# Patient Record
Sex: FEMALE | Race: BLACK OR AFRICAN AMERICAN | Employment: OTHER | URBAN - NONMETROPOLITAN AREA
[De-identification: names, ages, dates, MRNs, and addresses within clinical notes are randomized per-mention and may not be internally consistent; named-entity substitution may affect disease eponyms.]

---

## 2020-09-27 ENCOUNTER — HOSPITAL ENCOUNTER (OUTPATIENT)
Age: 85
Setting detail: OBSERVATION
Discharge: NURSING, REHAB OR LONG-TERM CARE | End: 2020-10-06
Attending: INTERNAL MEDICINE | Admitting: INTERNAL MEDICINE
Payer: MEDICARE

## 2020-09-27 PROBLEM — U07.1 COVID-19: Status: ACTIVE | Noted: 2020-09-27

## 2020-09-27 PROCEDURE — 74011250637 HC RX REV CODE- 250/637: Performed by: INTERNAL MEDICINE

## 2020-09-27 PROCEDURE — 99218 HC RM OBSERVATION: CPT

## 2020-09-27 RX ORDER — ONDANSETRON 2 MG/ML
4 INJECTION INTRAMUSCULAR; INTRAVENOUS
Status: DISCONTINUED | OUTPATIENT
Start: 2020-09-27 | End: 2020-10-06 | Stop reason: HOSPADM

## 2020-09-27 RX ORDER — TOPIRAMATE 100 MG/1
100 TABLET, FILM COATED ORAL EVERY 12 HOURS
Status: DISCONTINUED | OUTPATIENT
Start: 2020-09-27 | End: 2020-10-06 | Stop reason: HOSPADM

## 2020-09-27 RX ORDER — ENOXAPARIN SODIUM 100 MG/ML
40 INJECTION SUBCUTANEOUS DAILY
Status: DISCONTINUED | OUTPATIENT
Start: 2020-09-28 | End: 2020-09-28

## 2020-09-27 RX ORDER — LATANOPROST 50 UG/ML
1 SOLUTION/ DROPS OPHTHALMIC EVERY EVENING
Status: DISCONTINUED | OUTPATIENT
Start: 2020-09-28 | End: 2020-10-06 | Stop reason: HOSPADM

## 2020-09-27 RX ORDER — SODIUM CHLORIDE 0.9 % (FLUSH) 0.9 %
5-40 SYRINGE (ML) INJECTION AS NEEDED
Status: DISCONTINUED | OUTPATIENT
Start: 2020-09-27 | End: 2020-09-27

## 2020-09-27 RX ORDER — SODIUM CHLORIDE 0.9 % (FLUSH) 0.9 %
5-40 SYRINGE (ML) INJECTION EVERY 8 HOURS
Status: DISCONTINUED | OUTPATIENT
Start: 2020-09-27 | End: 2020-09-27

## 2020-09-27 RX ORDER — ACETAMINOPHEN 325 MG/1
650 TABLET ORAL
Status: DISCONTINUED | OUTPATIENT
Start: 2020-09-27 | End: 2020-10-06 | Stop reason: HOSPADM

## 2020-09-27 RX ORDER — PRAVASTATIN SODIUM 20 MG/1
20 TABLET ORAL
Status: DISCONTINUED | OUTPATIENT
Start: 2020-09-27 | End: 2020-10-06 | Stop reason: HOSPADM

## 2020-09-27 RX ORDER — LANOLIN ALCOHOL/MO/W.PET/CERES
1000 CREAM (GRAM) TOPICAL DAILY
Status: DISCONTINUED | OUTPATIENT
Start: 2020-09-28 | End: 2020-10-06 | Stop reason: HOSPADM

## 2020-09-27 RX ORDER — TOPIRAMATE 100 MG/1
100 TABLET, FILM COATED ORAL 2 TIMES DAILY
Status: DISCONTINUED | OUTPATIENT
Start: 2020-09-27 | End: 2020-09-27

## 2020-09-27 RX ORDER — PROMETHAZINE HYDROCHLORIDE 25 MG/1
12.5 TABLET ORAL
Status: DISCONTINUED | OUTPATIENT
Start: 2020-09-27 | End: 2020-10-06 | Stop reason: HOSPADM

## 2020-09-27 RX ORDER — POLYETHYLENE GLYCOL 3350 17 G/17G
17 POWDER, FOR SOLUTION ORAL DAILY PRN
Status: DISCONTINUED | OUTPATIENT
Start: 2020-09-27 | End: 2020-10-06 | Stop reason: HOSPADM

## 2020-09-27 RX ORDER — ACETAMINOPHEN 650 MG/1
650 SUPPOSITORY RECTAL
Status: DISCONTINUED | OUTPATIENT
Start: 2020-09-27 | End: 2020-10-06 | Stop reason: HOSPADM

## 2020-09-27 RX ORDER — CALCIUM CARBONATE/VITAMIN D3 250-3.125
2 TABLET ORAL DAILY
Status: DISCONTINUED | OUTPATIENT
Start: 2020-09-28 | End: 2020-10-06 | Stop reason: HOSPADM

## 2020-09-27 RX ORDER — ASPIRIN 81 MG/1
81 TABLET ORAL DAILY
Status: DISCONTINUED | OUTPATIENT
Start: 2020-09-28 | End: 2020-10-06 | Stop reason: HOSPADM

## 2020-09-27 RX ADMIN — TOPIRAMATE 100 MG: 100 TABLET, FILM COATED ORAL at 21:53

## 2020-09-27 RX ADMIN — PRAVASTATIN SODIUM 20 MG: 20 TABLET ORAL at 21:52

## 2020-09-27 NOTE — H&P
History and Physical    Patient: Flaco Lai MRN: [id number]      YOB: 1930  Age: 80 y.o. Sex: female      Subjective:      Flaco Lai is a 80 y.o. female who was admitted to [facility] from [facility] for positive COVID test.  Patient has no respiratory symptoms nor any physical complaints at this time. She has been admitted for further monitoring for her positive COVID diagnosis. Past Medical History:   Diagnosis Date    Dyslipidemia     Hematuria     Hypertension     Lymphoma ([address]      No past surgical history on file. No family history on file. Social History     Tobacco Use    Smoking status: Never Smoker    Smokeless tobacco: Never Used   Substance Use Topics    Alcohol use: No     Alcohol/week: 0.0 standard drinks      No Known Allergies  Immunizations are UTD per pt. Patient will be admitted for COVID-19 [U07.1]  to hospitalist service as Observation and evaluated daily via physical assessment, diagnostic testing, and laboratory assessment. Review of Systems:  - CONSTITUTIONAL: Denies  fatigue, weight loss, fever and chills. - HEENT: Denies changes in vision and hearing.    - RESPIRATORY: Denies SOB and cough. ?    - CV: Denies palpitations and CP. ?    - GI: Denies abdominal pain, nausea, vomiting, diarrhea and constipation.    - : Denies dysuria and urinary frequency. ?    - MSK: Denies myalgia and joint pain. ?    - SKIN: Denies rash and pruritus.    - ?NEUROLOGICAL: Denies dizziness, weakness, headache and syncope. ?    - PSYCHIATRIC: Denies recent changes in mood. Denies anxiety and depression. Objective: There were no vitals filed for this visit. Physical Exam:  - GENERAL: Alert and oriented x 1. No acute distress. Well-nourished. ?- HEENT: EOMI. Anicteric. Moist mucous membranes. No scleral icterus. No cervical lymphadenopathy. Oropharynx moist without any lesions    -NECK: Supple, no tracheal deviation, no JVD, no significant lymphadenopathy, no thyromegaly noted. ?- LUNGS: Clear to auscultation bilaterally. No accessory muscle use. ? Chest symmetrical, No wheezing, rales, rhonchi noted. Appropriate respiratory effort.     - CARDIOVASCULAR: Regular rate and rhythm. No murmur, rubs, gallops, No edema appreciated. S1 & S2 audible. - ABDOMEN: Soft, non-tender and non-distended. No palpable masses. , lesions, hepatomegaly. Bowels active X4 quadrants. - SKIN: Warm, dry, intact, no bruising, lesions, or rashes noted. Color appropriate for ethnicity.     - MUSCULOSKELETAL:  no deformities    - NEUROLOGIC: No focal neurological deficits. CN II-XII grossly intact    - PSYCHIATRIC: Calm & Cooperative. Appropriate mood and affect. No results found for this or any previous visit (from the past 12 hour(s)).          Assessment/Plan:   COVID-19  Pt with no respiratory complaints/physical complaints  Supportive Care  Will monitor closely and modify POC accordingly    Hypertension  Continue Inderal    Dyslipidemia  Continue Pravastatin       Signed By: Rachael Trevino NP     September 27, 2020

## 2020-09-27 NOTE — ASSESSMENT & PLAN NOTE
Pt with no respiratory complaints/physical complaints  Supportive Care  Will monitor closely and modify POC accordingly  Currently on room air.   Continue droplet precautions  Patient is day 5 out of 10 days of isolation; anticipate returning back to [facility] October 5, 2020

## 2020-09-28 LAB
ANION GAP SERPL CALC-SCNC: 9 MMOL/L
BASOPHILS # BLD: 0 K/UL
BASOPHILS NFR BLD: 0 %
BUN SERPL-MCNC: 58 MG/DL (ref 9–21)
BUN/CREAT SERPL: 24
CA-I BLD-MCNC: 8.9 MG/DL (ref 8.5–10.5)
CHLORIDE SERPL-SCNC: 110 MMOL/L (ref 94–111)
CO2 SERPL-SCNC: 22 MMOL/L (ref 21–33)
CREAT SERPL-MCNC: 2.4 MG/DL (ref 0.7–1.2)
DIFFERENTIAL METHOD BLD: ABNORMAL
EOSINOPHIL # BLD: 0.1 K/UL
EOSINOPHIL NFR BLD: 2 %
ERYTHROCYTE [DISTWIDTH] IN BLOOD BY AUTOMATED COUNT: 13.6 % (ref 11.6–14.5)
GLUCOSE SERPL-MCNC: 73 MG/DL (ref 70–110)
HCT VFR BLD AUTO: 33.2 % (ref 35–45)
HGB BLD-MCNC: 9.9 % (ref 12–16)
IMM GRANULOCYTES # BLD AUTO: 0 K/UL
IMM GRANULOCYTES NFR BLD AUTO: 0 %
LYMPHOCYTES # BLD: 3.2 K/UL
LYMPHOCYTES NFR BLD: 43 %
MCH RBC QN AUTO: 30.8 PG (ref 24–34)
MCHC RBC AUTO-ENTMCNC: 29.8 G/DL (ref 31–37)
MCV RBC AUTO: 103.4 FL (ref 74–97)
MONOCYTES # BLD: 1.5 K/UL
MONOCYTES NFR BLD: 20 %
NEUTS BAND NFR BLD MANUAL: 4 %
NEUTS SEG # BLD: 2.5 K/UL
NEUTS SEG NFR BLD: 30 %
OTHER CELLS NFR BLD MANUAL: 1 %
PLATELET # BLD AUTO: 206 K/UL (ref 135–420)
PMV BLD AUTO: 10.7 FL
POTASSIUM SERPL-SCNC: 4 MMOL/L (ref 3.2–5.1)
RBC # BLD AUTO: 3.21 M/UL (ref 4.2–5.3)
RBC MORPH BLD: ABNORMAL
SODIUM SERPL-SCNC: 141 MMOL/L (ref 135–145)
WBC # BLD AUTO: 7.4 K/UL (ref 4.6–13.2)

## 2020-09-28 PROCEDURE — 36415 COLL VENOUS BLD VENIPUNCTURE: CPT

## 2020-09-28 PROCEDURE — 74011250637 HC RX REV CODE- 250/637: Performed by: INTERNAL MEDICINE

## 2020-09-28 PROCEDURE — 96372 THER/PROPH/DIAG INJ SC/IM: CPT

## 2020-09-28 PROCEDURE — 99218 HC RM OBSERVATION: CPT

## 2020-09-28 PROCEDURE — 80048 BASIC METABOLIC PNL TOTAL CA: CPT

## 2020-09-28 PROCEDURE — 74011000250 HC RX REV CODE- 250: Performed by: INTERNAL MEDICINE

## 2020-09-28 PROCEDURE — 85025 COMPLETE CBC W/AUTO DIFF WBC: CPT

## 2020-09-28 PROCEDURE — 74011250636 HC RX REV CODE- 250/636: Performed by: INTERNAL MEDICINE

## 2020-09-28 RX ORDER — HEPARIN SODIUM 5000 [USP'U]/ML
5000 INJECTION, SOLUTION INTRAVENOUS; SUBCUTANEOUS EVERY 12 HOURS
Status: DISCONTINUED | OUTPATIENT
Start: 2020-09-28 | End: 2020-10-06 | Stop reason: HOSPADM

## 2020-09-28 RX ADMIN — CALCIUM CARBONATE-CHOLECALCIFEROL TAB 250 MG-125 UNIT 2 TABLET: 250-125 TAB at 09:06

## 2020-09-28 RX ADMIN — ASPIRIN 81 MG: 81 TABLET, COATED ORAL at 09:02

## 2020-09-28 RX ADMIN — HEPARIN SODIUM 5000 UNITS: 5000 INJECTION INTRAVENOUS; SUBCUTANEOUS at 13:47

## 2020-09-28 RX ADMIN — TOPIRAMATE 100 MG: 100 TABLET, FILM COATED ORAL at 21:52

## 2020-09-28 RX ADMIN — PRAVASTATIN SODIUM 20 MG: 20 TABLET ORAL at 21:52

## 2020-09-28 RX ADMIN — HEPARIN SODIUM 5000 UNITS: 5000 INJECTION INTRAVENOUS; SUBCUTANEOUS at 23:08

## 2020-09-28 RX ADMIN — TOPIRAMATE 100 MG: 100 TABLET, FILM COATED ORAL at 09:02

## 2020-09-28 RX ADMIN — CYANOCOBALAMIN TAB 500 MCG 1000 MCG: 500 TAB at 09:02

## 2020-09-28 RX ADMIN — LATANOPROST 1 DROP: 50 SOLUTION OPHTHALMIC at 17:27

## 2020-09-28 NOTE — PROGRESS NOTES
Comprehensive Nutrition Assessment    Type and Reason for Visit: Initial    Nutrition Recommendations/Plan: Add Ensure Enlive  BID at breakfast and Lunch to current diet of Cardiac. Encourage patient she is safe and eating will benefit her ability to fight the virus. Nutrition Assessment:       Malnutrition Assessment:  Malnutrition Status: At risk for malnutrition (specify)(Dx COVID-19 may reduce intake)    Context:  Acute illness     Findings of the 6 clinical characteristics of malnutrition:   Energy Intake:  1 - 75% or less of est energy req for 7 or more days  Weight Loss:  No significant weight loss     Body Fat Loss:  No significant body fat loss,     Muscle Mass Loss:  1 - Mild muscle mass loss, Calf  Fluid Accumulation:  No significant fluid accumulation,     Strength:  Not performed         Estimated Daily Nutrient Needs:  Energy (kcal):  0238-9746 Kcal/d (20-25 kcal/kg)  Protein (g):  75-94 gm/d (1-1.25 gm/kg)       Fluid (ml/day):  1645-1943 ml/d (25-30 ml/kg)    Nutrition Related Findings:         Wounds:    None       Current Nutrition Therapies:  DIET CARDIAC Regular    Anthropometric Measures:  · Height:  5' 5\" (165.1 cm)  · Current Body Wt:  86.2 kg (190 lb 0.6 oz)   · Admission Body Wt:       · Usual Body Wt:  166 lb 3.6 oz     · Ideal Body Wt:  125 lbs:  152 %   · Adjusted Body Weight:   ; Weight Adjustment for: No adjustment   · Adjusted BMI:       · BMI Category:  Obese class 1 (BMI 30.0-34. 9)       Nutrition Diagnosis:   · Inadequate protein-energy intake related to psychological cause or life stress, renal dysfunction, other (specify)(recent dx of COVID -19 currently asymtomatic) as evidenced by lab values, other (specify)(altered environment, pt c/o COVID dx.)      Nutrition Interventions:   Food and/or Nutrient Delivery: Start oral nutrition supplement  Nutrition Education and Counseling: No recommendations at this time  Coordination of Nutrition Care: Interdisciplinary rounds    Goals:  Intake 51-75% of meals with intact skin and maintain electrolytes WNl and BUN/Creatinine stable.        Nutrition Monitoring and Evaluation:   Behavioral-Environmental Outcomes: (Labs/intake)  Food/Nutrient Intake Outcomes: Food and nutrient intake, Supplement intake  Physical Signs/Symptoms Outcomes: Biochemical data    Discharge Planning:    Continue current diet, Continue oral nutrition supplement     Electronically signed by Fredy Sims on 9/28/2020 at 12:17 PM    Contact:[phone]

## 2020-09-28 NOTE — PROGRESS NOTES
Patient had an incontinent episode of diarrhea. Brief clean and dry. Repositioned. Bed in lowest position. CBWR.

## 2020-09-28 NOTE — PROGRESS NOTES
Confirmed code status with [facility], patient has DNR in place and had hospice services in place prior to transitioning to the hospital.

## 2020-09-28 NOTE — PROGRESS NOTES
Problem: Falls - Risk of  Goal: *Absence of Falls  Description: Document Alisha Beto Fall Risk and appropriate interventions in the flowsheet.   Outcome: Progressing Towards Goal  Note: Fall Risk Interventions:

## 2020-09-28 NOTE — PROGRESS NOTES
Assessment completed. Pt. Resting comfortably in bed, NAD noted. Pt. Is oriented to self only. Bed in lowest position, safety measures in place. Will continue to monitor.  CBWR

## 2020-09-28 NOTE — PROGRESS NOTES
Progress Notes    Patient: Simone Cortés MRN: [id number]      YOB: 1930  Age: 80 y.o. Sex: female      Subjective:      Simone Cortés is a 80 y.o. female with PMH HTN, dyslipidemia who was admitted 9/27/20 due to a positive COVID 19 test from the [facility]. She is however asymptomatic not in distress, and with stable VS.    Seen and examined in room today. Alert and oriented to self and person. However, not replying to questions asked this a.m    Past Medical History:   Diagnosis Date    Dyslipidemia     Hematuria     Hypertension     Lymphoma ([address]      No past surgical history on file. No family history on file. Social History     Tobacco Use    Smoking status: Never Smoker    Smokeless tobacco: Never Used   Substance Use Topics    Alcohol use: No     Alcohol/week: 0.0 standard drinks      No Known Allergies  Immunizations are UTD per pt. Patient will be admitted for COVID-19 [U07.1]  to hospitalist service as Observation and evaluated daily via physical assessment, diagnostic testing, and laboratory assessment. Review of Systems:  - Unable to obtain ROS, as patient not responding to questions. Objective:     Vitals:    09/27/20 2025 09/28/20 0150 09/28/20 0908 09/28/20 1123   BP:  (!) 125/58 130/63    Pulse:  80 82    Resp:  20 16    Temp:  98.1 °F (36.7 °C) 98.2 °F (36.8 °C)    SpO2:  96% 98%    Weight: 86.2 kg (190 lb 0.6 oz)      Height: 5' 5\" (1.651 m)   5' 5\" (1.651 m)        Physical Exam:  - GENERAL: Alert and oriented x 1. No acute distress. Well-nourished. ?- HEENT: EOMI. Anicteric. Moist mucous membranes. No scleral icterus. No cervical lymphadenopathy. Oropharynx moist without any lesions    -NECK: Supple, no tracheal deviation, no JVD, no significant lymphadenopathy, no thyromegaly noted. ?- LUNGS: Clear to auscultation bilaterally. No accessory muscle use. ? Chest symmetrical, No wheezing, rales, rhonchi noted.  Appropriate respiratory effort.     - CARDIOVASCULAR: Regular rate and rhythm. No murmur, rubs, gallops, No edema appreciated. S1 & S2 audible. - ABDOMEN: Soft, non-tender and non-distended. No palpable masses. , lesions, hepatomegaly. Bowels active X4 quadrants. - SKIN: Warm, dry, intact, no bruising, lesions, or rashes noted. Color appropriate for ethnicity.     - MUSCULOSKELETAL:  no deformities    - NEUROLOGIC: No focal neurological deficits. CN II-XII grossly intact    - PSYCHIATRIC: Calm & Cooperative. Appropriate mood and affect. Recent Results (from the past 12 hour(s))   METABOLIC PANEL, BASIC    Collection Time: 09/28/20  4:50 AM   Result Value Ref Range    Sodium 141 135 - 145 mmol/L    Potassium 4.0 3.2 - 5.1 mmol/L    Chloride 110 94 - 111 mmol/L    CO2 22 21 - 33 mmol/L    Anion gap 9 mmol/L    Glucose 73 70 - 110 mg/dL    BUN 58 (H) 9 - 21 mg/dL    Creatinine 2.40 (H) 0.70 - 1.20 mg/dL    BUN/Creatinine ratio 24      GFR est AA 23 ml/min/1.73m2    GFR est non-AA 19 ml/min/1.73m2    Calcium 8.9 8.5 - 10.5 mg/dL   CBC WITH AUTOMATED DIFF    Collection Time: 09/28/20  4:50 AM   Result Value Ref Range    WBC 7.4 4.6 - 13.2 K/uL    RBC 3.21 (L) 4.20 - 5.30 M/uL    HGB 9.9 (L) 12.0 - 16.0 %    HCT 33.2 (L) 35.0 - 45.0 %    .4 (H) 74.0 - 97.0 FL    MCH 30.8 24.0 - 34.0 PG    MCHC 29.8 (L) 31.0 - 37.0 g/dL    RDW 13.6 11.6 - 14.5 %    PLATELET 918 231 - 573 K/uL    MPV 10.7 FL    NEUTROPHILS 30 %    BAND NEUTROPHILS 4 %    LYMPHOCYTES 43 %    MONOCYTES 20 %    EOSINOPHILS 2 %    BASOPHILS 0 %    OTHER CELL 1 %    IMMATURE GRANULOCYTES 0 %    ABS. NEUTROPHILS 2.5 K/UL    ABS. LYMPHOCYTES 3.2 K/UL    ABS. MONOCYTES 1.5 K/UL    ABS. EOSINOPHILS 0.1 K/UL    ABS. BASOPHILS 0.0 K/UL    ABS. IMM. GRANS. 0.0 K/UL    DF Manual      RBC COMMENTS Macrocytosis  1+                Assessment/Plan:    #1. COVID-19 Positive:  -Asymptomatic.  -Continue supportive care.  -Case management for d/c planning. #2: HTN:  -Chronic condition, controlled.   -On Inderal.    #3: Renal Insufficiency  -Cr level 2.4, BUN 20, likely chronic. -BMP in a.m. monitor. #4: Dyslipidemia.  -On statin.     Signed By: Samuel Bueno NP     September 28, 2020 ELADIA (acute kidney injury)

## 2020-09-29 PROCEDURE — 99218 HC RM OBSERVATION: CPT

## 2020-09-29 PROCEDURE — 74011250636 HC RX REV CODE- 250/636: Performed by: INTERNAL MEDICINE

## 2020-09-29 PROCEDURE — 96372 THER/PROPH/DIAG INJ SC/IM: CPT

## 2020-09-29 PROCEDURE — 74011250637 HC RX REV CODE- 250/637: Performed by: INTERNAL MEDICINE

## 2020-09-29 RX ADMIN — CALCIUM CARBONATE-CHOLECALCIFEROL TAB 250 MG-125 UNIT 2 TABLET: 250-125 TAB at 09:56

## 2020-09-29 RX ADMIN — LATANOPROST 1 DROP: 50 SOLUTION OPHTHALMIC at 18:00

## 2020-09-29 RX ADMIN — CYANOCOBALAMIN TAB 500 MCG 1000 MCG: 500 TAB at 09:56

## 2020-09-29 RX ADMIN — HEPARIN SODIUM 5000 UNITS: 5000 INJECTION INTRAVENOUS; SUBCUTANEOUS at 13:57

## 2020-09-29 RX ADMIN — TOPIRAMATE 100 MG: 100 TABLET, FILM COATED ORAL at 09:56

## 2020-09-29 RX ADMIN — ASPIRIN 81 MG: 81 TABLET, COATED ORAL at 09:56

## 2020-09-29 NOTE — PROGRESS NOTES
PT. Has slept soundly through the night. Incontinence checks q2h and prn, pt. Was changed of incontinent urine x3. Pt. Had a small formed stool this shift. NAD noted through the night. No SOB noted. Pt. pleasantly confused. Bed in lowest position, CBWR. Will continue to monitor.

## 2020-09-29 NOTE — PROGRESS NOTES
Bedside and Verbal shift change report given to Khushboo Agarwal RN  (oncoming nurse) by Carollynn Epley, LPN (offgoing nurse). Report included the following information SBAR, Kardex, Procedure Summary, Intake/Output, MAR, Recent Results, Med Rec Status, Alarm Parameters , Quality Measures and Dual Neuro Assessment.

## 2020-09-29 NOTE — PROGRESS NOTES
HS medication given, pt. Tolerated well. Brief clean and dry. Lungs clear with diminished bases. No cough or SOB noted. Pt. Resting comfortably in bed, will continue to monitor.

## 2020-09-29 NOTE — PROGRESS NOTES
Progress Notes    Patient: Cecelia Arias MRN: [id number]      YOB: 1930  Age: 80 y.o. Sex: female      Subjective:      Cecelia Arias is a 80 y.o. female with PMH HTN, dyslipidemia who was admitted 9/27/20 due to a positive COVID 19 test from the [facility]. She is however asymptomatic not in distress, and with stable VS.    Today is hospital day #3, continuing supportive care only. Seen and examined in room today. Alert and oriented to self and person, eating breakfast.  No complaints voiced. Overnight stay uneventful. Past Medical History:   Diagnosis Date    Dyslipidemia     Hematuria     Hypertension     Lymphoma ([address]      No past surgical history on file. No family history on file. Social History     Tobacco Use    Smoking status: Never Smoker    Smokeless tobacco: Never Used   Substance Use Topics    Alcohol use: No     Alcohol/week: 0.0 standard drinks      No Known Allergies  IReview of Systems:  -Negative except as mentioned above in HPI. Objective:     Vitals:    09/28/20 1721 09/28/20 2035 09/28/20 2309 09/29/20 0843   BP: 128/78 (!) 128/53 (!) 125/59 (!) 135/55   Pulse: 95 78 85 91   Resp: 20 16 18 16   Temp: 98.9 °F (37.2 °C) 98 °F (36.7 °C) 97.1 °F (36.2 °C) 99.5 °F (37.5 °C)   SpO2: 100% 98% 98%    Weight:       Height:            Physical Exam:  - GENERAL: Alert and oriented x 1. No acute distress. Well-nourished. ?- HEENT: EOMI. Anicteric. Moist mucous membranes. No scleral icterus. No cervical lymphadenopathy. Oropharynx moist without any lesions    -NECK: Supple, no tracheal deviation, no JVD, no significant lymphadenopathy, no thyromegaly noted. ?- LUNGS: Clear to auscultation bilaterally. No accessory muscle use. ? Chest symmetrical, No wheezing, rales, rhonchi noted. Appropriate respiratory effort.     - CARDIOVASCULAR: Regular rate and rhythm. No murmur, rubs, gallops, No edema appreciated. S1 & S2 audible.      - ABDOMEN: Soft, non-tender and non-distended. No palpable masses. , lesions, hepatomegaly. Bowels active X4 quadrants. - SKIN: Warm, dry, intact, no bruising, lesions, or rashes noted. Color appropriate for ethnicity.     - MUSCULOSKELETAL:  no deformities    - NEUROLOGIC: No focal neurological deficits. CN II-XII grossly intact    - PSYCHIATRIC: Calm & Cooperative. Appropriate mood and affect. No results found for this or any previous visit (from the past 12 hour(s)). Assessment/Plan:    #1. COVID-19 Positive:  -Asymptomatic.  -Continue supportive care.  -Case management for d/c planning. #2: HTN:  -Chronic condition, controlled.  -On Inderal.    #3: Renal Insufficiency  -Cr level 2.4, BUN 20, likely chronic. -BMP in a.m. monitor. #4: Dyslipidemia.  -On statin.     Signed By: Cass Juarez NP     September 29, 2020

## 2020-09-30 LAB
ANION GAP SERPL CALC-SCNC: 8 MMOL/L
BUN SERPL-MCNC: 56 MG/DL (ref 9–21)
BUN/CREAT SERPL: 22
CA-I BLD-MCNC: 9.1 MG/DL (ref 8.5–10.5)
CHLORIDE SERPL-SCNC: 112 MMOL/L (ref 94–111)
CO2 SERPL-SCNC: 23 MMOL/L (ref 21–33)
CREAT SERPL-MCNC: 2.5 MG/DL (ref 0.7–1.2)
GLUCOSE SERPL-MCNC: 74 MG/DL (ref 70–110)
POTASSIUM SERPL-SCNC: 3.9 MMOL/L (ref 3.2–5.1)
SODIUM SERPL-SCNC: 143 MMOL/L (ref 135–145)

## 2020-09-30 PROCEDURE — 74011250636 HC RX REV CODE- 250/636: Performed by: NURSE PRACTITIONER

## 2020-09-30 PROCEDURE — 99218 HC RM OBSERVATION: CPT

## 2020-09-30 PROCEDURE — 80048 BASIC METABOLIC PNL TOTAL CA: CPT

## 2020-09-30 PROCEDURE — 74011250636 HC RX REV CODE- 250/636: Performed by: INTERNAL MEDICINE

## 2020-09-30 PROCEDURE — 74011250637 HC RX REV CODE- 250/637: Performed by: INTERNAL MEDICINE

## 2020-09-30 PROCEDURE — 36415 COLL VENOUS BLD VENIPUNCTURE: CPT

## 2020-09-30 PROCEDURE — 96372 THER/PROPH/DIAG INJ SC/IM: CPT

## 2020-09-30 RX ORDER — SODIUM CHLORIDE 9 MG/ML
75 INJECTION, SOLUTION INTRAVENOUS CONTINUOUS
Status: DISCONTINUED | OUTPATIENT
Start: 2020-09-30 | End: 2020-10-01

## 2020-09-30 RX ADMIN — PRAVASTATIN SODIUM 20 MG: 20 TABLET ORAL at 23:25

## 2020-09-30 RX ADMIN — CYANOCOBALAMIN TAB 500 MCG 1000 MCG: 500 TAB at 10:34

## 2020-09-30 RX ADMIN — CALCIUM CARBONATE-CHOLECALCIFEROL TAB 250 MG-125 UNIT 2 TABLET: 250-125 TAB at 10:34

## 2020-09-30 RX ADMIN — TOPIRAMATE 100 MG: 100 TABLET, FILM COATED ORAL at 10:34

## 2020-09-30 RX ADMIN — TOPIRAMATE 100 MG: 100 TABLET, FILM COATED ORAL at 23:26

## 2020-09-30 RX ADMIN — ASPIRIN 81 MG: 81 TABLET, COATED ORAL at 10:34

## 2020-09-30 RX ADMIN — HEPARIN SODIUM 5000 UNITS: 5000 INJECTION INTRAVENOUS; SUBCUTANEOUS at 23:25

## 2020-09-30 RX ADMIN — SODIUM CHLORIDE 75 ML/HR: 9 INJECTION, SOLUTION INTRAVENOUS at 14:37

## 2020-09-30 RX ADMIN — LATANOPROST 1 DROP: 50 SOLUTION OPHTHALMIC at 17:30

## 2020-09-30 RX ADMIN — HEPARIN SODIUM 5000 UNITS: 5000 INJECTION INTRAVENOUS; SUBCUTANEOUS at 11:11

## 2020-09-30 NOTE — PROGRESS NOTES
Bedside and Verbal shift change report given to Lisa Cortez RN  (oncoming nurse) by Khushboo Agarwal RN  (offgoing nurse). Report included the following information SBAR, Kardex, ED Summary, Procedure Summary, Intake/Output, MAR, Recent Results, Med Rec Status, Alarm Parameters , Quality Measures and Dual Neuro Assessment.

## 2020-09-30 NOTE — PROGRESS NOTES
Problem: Pressure Injury - Risk of  Goal: *Prevention of pressure injury  Description: Document Booker Scale and appropriate interventions in the flowsheet. Outcome: Progressing Towards Goal  Note: Pressure Injury Interventions:  Sensory Interventions: Keep linens dry and wrinkle-free    Moisture Interventions: Absorbent underpads         Mobility Interventions: Turn and reposition approx. every two hours(pillow and wedges)    Nutrition Interventions: Document food/fluid/supplement intake    Friction and Shear Interventions: Minimize layers                Problem: Patient Education: Go to Patient Education Activity  Goal: Patient/Family Education  Outcome: Progressing Towards Goal     Problem: Falls - Risk of  Goal: *Absence of Falls  Description: Document Angelo Fall Risk and appropriate interventions in the flowsheet.   Outcome: Progressing Towards Goal  Note: Fall Risk Interventions:                                Problem: Nutrition Deficit  Goal: *Optimize nutritional status  Outcome: Progressing Towards Goal

## 2020-09-30 NOTE — PROGRESS NOTES
Progress Notes    Patient: Lasalle Councilman MRN: [id number]      YOB: 1930  Age: 80 y.o. Sex: female      Subjective:      Lasalle Councilman is a 80 y.o. AA female with PMH HTN, dyslipidemia who was admitted 9/27/20 due to a positive COVID 19 test from the [facility]. She is however asymptomatic not in distress, and with stable VS. She is tolerating room air. Today is hospital day #4, continuing supportive care only. Seen and examined in room today. Alert and oriented to person and place only. Denies complaints, no concerns voiced by nursing. Past Medical History:   Diagnosis Date    Dyslipidemia     Hematuria     Hypertension     [address] [address]      No past surgical history on file. No family history on file. Social History     Tobacco Use    Smoking status: Never Smoker    Smokeless tobacco: Never Used   Substance Use Topics    Alcohol use: No     Alcohol/week: 0.0 standard drinks      No Known Allergies  IReview of Systems:  -Negative except as mentioned above in HPI. Objective:     Vitals:    09/29/20 0843 09/29/20 2000 09/30/20 0400 09/30/20 0822   BP: (!) 135/55 (!) 119/43 (!) 126/52 (!) 115/55   Pulse: 91 82 86 86   Resp: 16 20 20 18   Temp: 99.5 °F (37.5 °C) 99.2 °F (37.3 °C) 99 °F (37.2 °C) 98.1 °F (36.7 °C)   SpO2:  100% 100%    Weight:       Height:            Physical Exam:  - GENERAL: Alert and oriented x 1. No acute distress. Well-nourished.      - HEENT: EOMI. Anicteric. Moist mucous membranes. No scleral icterus. No cervical lymphadenopathy. Oropharynx moist without any lesions    -NECK: Supple, no tracheal deviation, no JVD, no significant lymphadenopathy, no thyromegaly noted. - LUNGS: Clear to auscultation bilaterally. No accessory muscle use. Chest symmetrical, No wheezing, rales, rhonchi noted. Appropriate respiratory effort. Tolerating room air.     - CARDIOVASCULAR: Regular rate and rhythm. No murmur, rubs, gallops, No edema appreciated.  S1 & S2 audible. - ABDOMEN: Soft, non-tender and non-distended. No palpable masses. , lesions, hepatomegaly. Bowels active X4 quadrants. - SKIN: Warm, dry, intact, no bruising, lesions, or rashes noted. Color appropriate for ethnicity.     - MUSCULOSKELETAL:  no deformities    - NEUROLOGIC: No focal neurological deficits. CN II-XII grossly intact. A&O to person and place.     - PSYCHIATRIC: Calm & Cooperative. Appropriate mood and affect. Recent Results (from the past 12 hour(s))   METABOLIC PANEL, BASIC    Collection Time: 09/30/20  3:48 AM   Result Value Ref Range    Sodium 143 135 - 145 mmol/L    Potassium 3.9 3.2 - 5.1 mmol/L    Chloride 112 (H) 94 - 111 mmol/L    CO2 23 21 - 33 mmol/L    Anion gap 8 mmol/L    Glucose 74 70 - 110 mg/dL    BUN 56 (H) 9 - 21 mg/dL    Creatinine 2.50 (H) 0.70 - 1.20 mg/dL    BUN/Creatinine ratio 22      GFR est AA 22 ml/min/1.73m2    GFR est non-AA 18 ml/min/1.73m2    Calcium 9.1 8.5 - 10.5 mg/dL            Assessment/Plan:    #1. COVID-19 Positive:  -Asymptomatic.  -Continue supportive care.  -Case management for d/c planning. #2: HTN:  -Chronic condition, controlled. -B/P 115/55  -On Inderal.    #3: Renal Insufficiency  -Cr level 2.5.  -BUN 50  -Likely dehydration  -Add gentle hydration    -Nephro consult if not improved with gentle IV hydration   -Encourage PO intake  -BMP in a.m. #4: Dyslipidemia.  -On statin.     Signed By: Rachael Aleman NP     September 30, 2020

## 2020-10-01 PROBLEM — N18.30 CKD (CHRONIC KIDNEY DISEASE), STAGE III (HCC): Status: ACTIVE | Noted: 2020-10-01

## 2020-10-01 LAB
ANION GAP SERPL CALC-SCNC: 10 MMOL/L
BASOPHILS # BLD: 0.1 K/UL (ref 0–0.1)
BASOPHILS NFR BLD: 1 % (ref 0–2)
BUN SERPL-MCNC: 52 MG/DL (ref 9–21)
BUN/CREAT SERPL: 24
CA-I BLD-MCNC: 8.9 MG/DL (ref 8.5–10.5)
CHLORIDE SERPL-SCNC: 114 MMOL/L (ref 94–111)
CO2 SERPL-SCNC: 18 MMOL/L (ref 21–33)
CREAT SERPL-MCNC: 2.2 MG/DL (ref 0.7–1.2)
EOSINOPHIL # BLD: 0.1 K/UL (ref 0–0.4)
EOSINOPHIL NFR BLD: 1 % (ref 0–5)
ERYTHROCYTE [DISTWIDTH] IN BLOOD BY AUTOMATED COUNT: 13.4 % (ref 11.6–14.5)
GLUCOSE SERPL-MCNC: 69 MG/DL (ref 70–110)
HCT VFR BLD AUTO: 31.7 % (ref 35–45)
HGB BLD-MCNC: 9.6 % (ref 12–16)
IMM GRANULOCYTES # BLD AUTO: 0.2 K/UL
IMM GRANULOCYTES NFR BLD AUTO: 2 %
LYMPHOCYTES # BLD: 3.5 K/UL (ref 0.9–3.6)
LYMPHOCYTES NFR BLD: 40 % (ref 21–52)
MAGNESIUM SERPL-MCNC: 2.1 MG/DL (ref 1.7–2.8)
MCH RBC QN AUTO: 31.5 PG (ref 24–34)
MCHC RBC AUTO-ENTMCNC: 30.3 G/DL (ref 31–37)
MCV RBC AUTO: 103.9 FL (ref 74–97)
MONOCYTES # BLD: 1.1 K/UL (ref 0.05–1.2)
MONOCYTES NFR BLD: 12 % (ref 3–10)
NEUTS SEG # BLD: 3.8 K/UL (ref 1.8–8)
NEUTS SEG NFR BLD: 44 % (ref 40–73)
PLATELET # BLD AUTO: 204 K/UL (ref 135–420)
PMV BLD AUTO: 10.6 FL
POTASSIUM SERPL-SCNC: 4.4 MMOL/L (ref 3.2–5.1)
RBC # BLD AUTO: 3.05 M/UL (ref 4.2–5.3)
SODIUM SERPL-SCNC: 142 MMOL/L (ref 135–145)
WBC # BLD AUTO: 8.7 K/UL (ref 4.6–13.2)

## 2020-10-01 PROCEDURE — 83735 ASSAY OF MAGNESIUM: CPT

## 2020-10-01 PROCEDURE — 99218 HC RM OBSERVATION: CPT

## 2020-10-01 PROCEDURE — 96372 THER/PROPH/DIAG INJ SC/IM: CPT

## 2020-10-01 PROCEDURE — 74011250636 HC RX REV CODE- 250/636: Performed by: NURSE PRACTITIONER

## 2020-10-01 PROCEDURE — 74011250636 HC RX REV CODE- 250/636: Performed by: INTERNAL MEDICINE

## 2020-10-01 PROCEDURE — 80048 BASIC METABOLIC PNL TOTAL CA: CPT

## 2020-10-01 PROCEDURE — 74011250637 HC RX REV CODE- 250/637: Performed by: INTERNAL MEDICINE

## 2020-10-01 PROCEDURE — 36415 COLL VENOUS BLD VENIPUNCTURE: CPT

## 2020-10-01 PROCEDURE — 85025 COMPLETE CBC W/AUTO DIFF WBC: CPT

## 2020-10-01 RX ORDER — POTASSIUM CHLORIDE AND SODIUM CHLORIDE 450; 150 MG/100ML; MG/100ML
INJECTION, SOLUTION INTRAVENOUS CONTINUOUS
Status: DISPENSED | OUTPATIENT
Start: 2020-10-01 | End: 2020-10-02

## 2020-10-01 RX ADMIN — LATANOPROST 1 DROP: 50 SOLUTION OPHTHALMIC at 18:00

## 2020-10-01 RX ADMIN — CALCIUM CARBONATE-CHOLECALCIFEROL TAB 250 MG-125 UNIT 2 TABLET: 250-125 TAB at 10:05

## 2020-10-01 RX ADMIN — PRAVASTATIN SODIUM 20 MG: 20 TABLET ORAL at 23:18

## 2020-10-01 RX ADMIN — TOPIRAMATE 100 MG: 100 TABLET, FILM COATED ORAL at 23:18

## 2020-10-01 RX ADMIN — CYANOCOBALAMIN TAB 500 MCG 1000 MCG: 500 TAB at 10:05

## 2020-10-01 RX ADMIN — ASPIRIN 81 MG: 81 TABLET, COATED ORAL at 10:05

## 2020-10-01 RX ADMIN — HEPARIN SODIUM 5000 UNITS: 5000 INJECTION INTRAVENOUS; SUBCUTANEOUS at 23:15

## 2020-10-01 RX ADMIN — TOPIRAMATE 100 MG: 100 TABLET, FILM COATED ORAL at 10:05

## 2020-10-01 RX ADMIN — POTASSIUM CHLORIDE AND SODIUM CHLORIDE: 450; 150 INJECTION, SOLUTION INTRAVENOUS at 12:30

## 2020-10-01 RX ADMIN — HEPARIN SODIUM 5000 UNITS: 5000 INJECTION INTRAVENOUS; SUBCUTANEOUS at 12:32

## 2020-10-01 NOTE — PROGRESS NOTES
Bedside and Verbal shift change report given to Mark Gallagher RN  (oncoming nurse) by Selina Enamorado LPN (offgoing nurse). Report included the following information SBAR, Kardex, Intake/Output, MAR, Recent Results, Med Rec Status, Alarm Parameters , Quality Measures and Dual Neuro Assessment.

## 2020-10-01 NOTE — PROGRESS NOTES
Late Entry: Bedside and Verbal shift change report given to ALAN Lee, @ 8131  (oncoming nurse) by Khushboo Agarwal RN  (offgoing nurse). Report included the following information SBAR, Kardex, Intake/Output, MAR, Recent Results, Med Rec Status, Quality Measures and Dual Neuro Assessment.

## 2020-10-01 NOTE — ASSESSMENT & PLAN NOTE
Renal function appears to be at baseline  Creatinine 2.2  No need for nephrology consult  Encourage fluids by mouth  Monitor renal function daily  Continue 1/2 normal saline at 75 mL's x1 L  Daily BMP

## 2020-10-01 NOTE — PROGRESS NOTES
Verbal shift change report given to [address] Colindres (oncoming nurse) by Carollynn Epley lpn(offgoing nurse). Report included the following information SBAR.

## 2020-10-01 NOTE — PROGRESS NOTES
Progress Note  Date:10/1/2020       Room:Pending sale to [facility]/  Patient Name:Patrick Crenshaw     Date of Birth:[birth year]     Age:89 y.o. Patient is noted at bedside eating breakfast.  He is in no acute distress. Vital signs are stable. Currently day 5 out of 10 of isolation. Subjective    Subjective:  Symptoms:  Resolved. No shortness of breath, chest pain, weakness or anorexia. Diet:  Poor intake. No nausea or vomiting. Activity level: Normal.    Pain:  She reports no pain. Review of Systems   Constitutional: Negative for fever. Respiratory: Negative for shortness of breath. Cardiovascular: Negative for chest pain. Gastrointestinal: Negative for anorexia, nausea and vomiting. Neurological: Negative for weakness. All other systems reviewed and are negative. Objective         Vitals Last 24 Hours:  TEMPERATURE:  Temp  Av.3 °F (36.8 °C)  Min: 97.5 °F (36.4 °C)  Max: 99.4 °F (37.4 °C)  RESPIRATIONS RANGE: Resp  Av  Min: 20  Max: 20  PULSE OXIMETRY RANGE: SpO2  Av %  Min: 100 %  Max: 100 %  PULSE RANGE: Pulse  Av.7  Min: 67  Max: 92  BLOOD PRESSURE RANGE: Systolic (54VKM), DHY:825 , Min:124 , FOL:787   ; Diastolic (94SAL), QBI:51, Min:51, Max:84    I/O (24Hr): Intake/Output Summary (Last 24 hours) at 10/1/2020 1152  Last data filed at 10/1/2020 0700  Gross per 24 hour   Intake 2033.75 ml   Output    Net 2033.75 ml     Objective:  General Appearance:  Comfortable, well-appearing, in no acute distress and not in pain. Vital signs: (most recent): Blood pressure 136/84, pulse 67, temperature 98 °F (36.7 °C), resp. rate 20, height 5' 5\" (1.651 m), weight 86.2 kg (190 lb 0.6 oz), SpO2 100 %. Vital signs are normal.  No fever. Output: Producing urine and producing stool. HEENT: Normal HEENT exam.    Lungs:  Normal effort and normal respiratory rate. Breath sounds clear to auscultation. She is not in respiratory distress.   No decreased breath sounds, wheezes or rhonchi. Heart: Normal rate. Regular rhythm. S1 normal and S2 normal.  No murmur or friction rub. Abdomen: Abdomen is soft. Hypoactive bowel sounds. There is no mass. There is no splenomegaly. There is no hepatomegaly. Extremities: (Essential tremor noted)  Pulses: Distal pulses are intact. Neurological: Patient is alert. Pupils:  Pupils are equal, round, and reactive to light. Skin:  Warm and dry. Labs/Imaging/Diagnostics    Labs:  CBC:  Recent Labs     10/01/20  0405   WBC 8.7   RBC 3.05*   HGB 9.6*   HCT 31.7*   .9*   RDW 13.4        CHEMISTRIES:  Recent Labs     10/01/20  0405 09/30/20  0348    143   K 4.4 3.9   * 112*   CO2 18* 23   BUN 52* 56*   CA 8.9 9.1   MG 2.1  --    PT/INR:No results for input(s): INR, INREXT in the last 72 hours. No lab exists for component: PROTIME  APTT:No results for input(s): APTT in the last 72 hours. LIVER PROFILE:No results for input(s): AST, ALT in the last 72 hours. No lab exists for component: BILIDIR, BILITOT, ALKPHOS  No results found for: ALT, AST, GGT, GGTP, AP, APIT, APX, CBIL, TBIL, TBILI    Imaging Last 24 Hours:  No results found.   Assessment//Plan   Principal Problem:    COVID-19 (9/27/2020)    Active Problems:    HTN (hypertension) (6/12/2009)      Dyslipidemia ()      CKD (chronic kidney disease), stage III (10/1/2020)      CKD (chronic kidney disease), stage III  Renal function appears to be at baseline  Creatinine 2.2  No need for nephrology consult  Encourage fluids by mouth  Monitor renal function daily  Continue 1/2 normal saline at 75 mL's x1 L  Daily BMP    Dyslipidemia  Continue Pravastatin  Continue daily labs  Cardiac diet    HTN (hypertension)  Monitor vital signs according to protocol  Continue home propanolol  Daily BMP  Cardiac diet    * COVID-19  Pt with no respiratory complaints/physical complaints  Supportive Care  Will monitor closely and modify POC accordingly  Currently on room air. Continue droplet precautions  Patient is day 5 out of 10 days of isolation; anticipate returning back to [facility] October 5, 2020      Assessment:    Condition: In stable condition. Unchanged. Plan:   Ad mary activity. Advance diet as tolerated. Give fluids, administer medications as ordered, resume home regimen and resume oral medications.          Electronically signed by Alban Sanders NP on 10/1/2020 at 11:52 AM

## 2020-10-02 LAB
ANION GAP SERPL CALC-SCNC: 7 MMOL/L
BASOPHILS # BLD: 0 K/UL (ref 0–0.1)
BASOPHILS NFR BLD: 1 % (ref 0–2)
BUN SERPL-MCNC: 45 MG/DL (ref 9–21)
BUN/CREAT SERPL: 24
CA-I BLD-MCNC: 8.7 MG/DL (ref 8.5–10.5)
CHLORIDE SERPL-SCNC: 111 MMOL/L (ref 94–111)
CO2 SERPL-SCNC: 22 MMOL/L (ref 21–33)
CREAT SERPL-MCNC: 1.9 MG/DL (ref 0.7–1.2)
EOSINOPHIL # BLD: 0.1 K/UL (ref 0–0.4)
EOSINOPHIL NFR BLD: 1 % (ref 0–5)
ERYTHROCYTE [DISTWIDTH] IN BLOOD BY AUTOMATED COUNT: 13.4 % (ref 11.6–14.5)
GLUCOSE SERPL-MCNC: 83 MG/DL (ref 70–110)
HCT VFR BLD AUTO: 31.5 % (ref 35–45)
HGB BLD-MCNC: 9.5 G/DL (ref 12–16)
IMM GRANULOCYTES # BLD AUTO: 0.3 K/UL
IMM GRANULOCYTES NFR BLD AUTO: 4 %
LYMPHOCYTES # BLD: 2.6 K/UL (ref 0.9–3.6)
LYMPHOCYTES NFR BLD: 33 % (ref 21–52)
MAGNESIUM SERPL-MCNC: 1.8 MG/DL (ref 1.7–2.8)
MCH RBC QN AUTO: 30.9 PG (ref 24–34)
MCHC RBC AUTO-ENTMCNC: 30.2 G/DL (ref 31–37)
MCV RBC AUTO: 102.6 FL (ref 74–97)
MONOCYTES # BLD: 1.1 K/UL (ref 0.05–1.2)
MONOCYTES NFR BLD: 14 % (ref 3–10)
NEUTS SEG # BLD: 3.8 K/UL (ref 1.8–8)
NEUTS SEG NFR BLD: 47 % (ref 40–73)
PLATELET # BLD AUTO: 208 K/UL (ref 135–420)
PMV BLD AUTO: 10.9 FL (ref 9.2–11.8)
POTASSIUM SERPL-SCNC: 4.1 MMOL/L (ref 3.2–5.1)
RBC # BLD AUTO: 3.07 M/UL (ref 4.2–5.3)
SODIUM SERPL-SCNC: 140 MMOL/L (ref 135–145)
WBC # BLD AUTO: 8 K/UL (ref 4.6–13.2)

## 2020-10-02 PROCEDURE — 96372 THER/PROPH/DIAG INJ SC/IM: CPT

## 2020-10-02 PROCEDURE — 74011250636 HC RX REV CODE- 250/636: Performed by: NURSE PRACTITIONER

## 2020-10-02 PROCEDURE — 83735 ASSAY OF MAGNESIUM: CPT

## 2020-10-02 PROCEDURE — 99218 HC RM OBSERVATION: CPT

## 2020-10-02 PROCEDURE — 80048 BASIC METABOLIC PNL TOTAL CA: CPT

## 2020-10-02 PROCEDURE — 74011250636 HC RX REV CODE- 250/636: Performed by: INTERNAL MEDICINE

## 2020-10-02 PROCEDURE — 85025 COMPLETE CBC W/AUTO DIFF WBC: CPT

## 2020-10-02 PROCEDURE — 74011250637 HC RX REV CODE- 250/637: Performed by: INTERNAL MEDICINE

## 2020-10-02 PROCEDURE — 36415 COLL VENOUS BLD VENIPUNCTURE: CPT

## 2020-10-02 RX ORDER — POTASSIUM CHLORIDE AND SODIUM CHLORIDE 450; 150 MG/100ML; MG/100ML
INJECTION, SOLUTION INTRAVENOUS CONTINUOUS
Status: DISPENSED | OUTPATIENT
Start: 2020-10-02 | End: 2020-10-03

## 2020-10-02 RX ADMIN — POTASSIUM CHLORIDE AND SODIUM CHLORIDE: 450; 150 INJECTION, SOLUTION INTRAVENOUS at 06:18

## 2020-10-02 RX ADMIN — HEPARIN SODIUM 5000 UNITS: 5000 INJECTION INTRAVENOUS; SUBCUTANEOUS at 23:36

## 2020-10-02 RX ADMIN — PRAVASTATIN SODIUM 20 MG: 20 TABLET ORAL at 21:02

## 2020-10-02 RX ADMIN — CALCIUM CARBONATE-CHOLECALCIFEROL TAB 250 MG-125 UNIT 2 TABLET: 250-125 TAB at 09:21

## 2020-10-02 RX ADMIN — TOPIRAMATE 100 MG: 100 TABLET, FILM COATED ORAL at 09:21

## 2020-10-02 RX ADMIN — POTASSIUM CHLORIDE AND SODIUM CHLORIDE: 450; 150 INJECTION, SOLUTION INTRAVENOUS at 23:45

## 2020-10-02 RX ADMIN — LATANOPROST 1 DROP: 50 SOLUTION OPHTHALMIC at 18:00

## 2020-10-02 RX ADMIN — ASPIRIN 81 MG: 81 TABLET, COATED ORAL at 09:21

## 2020-10-02 RX ADMIN — CYANOCOBALAMIN TAB 500 MCG 1000 MCG: 500 TAB at 09:21

## 2020-10-02 RX ADMIN — TOPIRAMATE 100 MG: 100 TABLET, FILM COATED ORAL at 21:02

## 2020-10-02 RX ADMIN — HEPARIN SODIUM 5000 UNITS: 5000 INJECTION INTRAVENOUS; SUBCUTANEOUS at 13:09

## 2020-10-02 NOTE — PROGRESS NOTES
RESTING QUIETLY AND AROUSES EASILY. BLOOD DRAWN WITHOUT DIFFICULTY AND SPECIMEN GIVEN TO PHLEBOTOMIST TO CARRY TO LAB TO BE TESTED. PATIENT. SOILED DIPER REMOVED. PATIENT CLEANED AND DRY DIPER APLIED. CALL BELL IN REACH. SIDE RAILS UP AND BED IN LOW POSITION.

## 2020-10-02 NOTE — PROGRESS NOTES
Assumed care of patient during shift change, patient is resting in bed at this time, call bell is in reach.

## 2020-10-02 NOTE — PROGRESS NOTES
PATIENT SITTING UP IN BED WITH EYES CLOSED. PATIENT AROUSES EASILY TO NAME CALLED. FULL BODY ASSESSMENT COMPLETED. SKIN WARM AND DRY. RESPIRATIONS EASY AND UNLABORED. CALL BELL IN REACH. SIDE RAILS UP X2 AND BED IN LOWEST POSITION. PATIENT INCONTIENT OF URINE. SOILED DIPER REMOVED. PATIENT CLEANED AND FRESH DIPER APPLIED. PATIENT POSITIONED ON RIGHT SIDE FOR COMFORT.

## 2020-10-02 NOTE — PROGRESS NOTES
Received care of patient resting in bed with eyes closed. Patient easily aroused patient cleaned of incontinent urine and repositioned.

## 2020-10-02 NOTE — PROGRESS NOTES
Comprehensive Nutrition Assessment    Type and Reason for Visit: Reassess    Nutrition Recommendations/Plan: Add Ensure Enlive TID with meals. Offer fluids between meals and encourage 8-10 cups fluid daily. Nutrition Assessment:  Nursing indicates pt intake has decreased and she will not allow nursing to feed her. Normal intake 51-75% most meals. Pt states to nursing, that her taste has changed. Labs - BUN 45- high and Creatinine 1.9- high- improving  with IVF; H/H 9.5/31. 5- low- recommend check FE studies. Pt is taking more liquid than solids; recommend add Ensure Enlive to each meal.  Spoke with Mrs. Vo - pt's family contact and she is in agreement with nutritional care plan. Malnutrition Assessment:  Malnutrition Status: At risk for malnutrition (specify)(Dx COVID-19 may reduce intake)    Context:  Acute illness     Findings of the 6 clinical characteristics of malnutrition:   Energy Intake:  1 - 75% or less of est energy req for 7 or more days  Weight Loss:  No significant weight loss     Body Fat Loss:  No significant body fat loss,     Muscle Mass Loss:  1 - Mild muscle mass loss, Calf  Fluid Accumulation:  No significant fluid accumulation,     Strength:  Not performed         Estimated Daily Nutrient Needs:  Energy (kcal):  1777-9584 Kcal/d (20--25 Kcal/kg)  Protein (g):  75-94 gm/d (1.0-1.25 gm/kg)       Fluid (ml/day):  3186-9290 ml/d (25-30ml/kg)    Nutrition Related Findings:  Labs - pt appears to have inadequate fluid intake and IVF started; H/H low need FE, folic acid, P00 studies. Nursing reports intake 0-25% meals; intake PTA was 51-75% meals.       Wounds:    None       Current Nutrition Therapies:  DIET CARDIAC Regular  Ensure Enlive TID     Anthropometric Measures:  · Height:  5' 5\" (165.1 cm)  · Current Body Wt:  75.3 kg (166 lb)   · Admission Body Wt:       · Usual Body Wt:  170 lb     · Ideal Body Wt:  125 lbs:  132.8 %   · Adjusted Body Weight:   ; Weight Adjustment for: No adjustment   · Adjusted BMI:       · BMI Category:  Normal weight (BMI 22.0-24.9) age over 72       Nutrition Diagnosis:   · Inadequate protein-energy intake related to altered taste perception, increased demand for energy/nutrients as evidenced by intake 0-25%      Nutrition Interventions:   Food and/or Nutrient Delivery: Start oral nutrition supplement  Nutrition Education and Counseling: No recommendations at this time  Coordination of Nutrition Care: Interdisciplinary rounds    Goals: Increase intake >/-50% meals and supplements with improved BUN, creatinine, skin intact and weight 75Kg =/-1 kg       Nutrition Monitoring and Evaluation:   Behavioral-Environmental Outcomes: (Labs/intake)  Food/Nutrient Intake Outcomes: Supplement intake  Physical Signs/Symptoms Outcomes: Biochemical data, Skin, Weight    Discharge Planning:     Too soon to determine     Electronically signed by Jatin Moon on 10/2/2020 at 1:19 PM    Contact: [phone]

## 2020-10-03 LAB
ANION GAP SERPL CALC-SCNC: 9 MMOL/L
BASOPHILS # BLD: 0 K/UL
BASOPHILS NFR BLD: 0 %
BUN SERPL-MCNC: 37 MG/DL (ref 9–21)
BUN/CREAT SERPL: 19
CA-I BLD-MCNC: 9 MG/DL (ref 8.5–10.5)
CHLORIDE SERPL-SCNC: 109 MMOL/L (ref 94–111)
CO2 SERPL-SCNC: 21 MMOL/L (ref 21–33)
CREAT SERPL-MCNC: 1.9 MG/DL (ref 0.7–1.2)
DIFFERENTIAL METHOD BLD: ABNORMAL
EOSINOPHIL # BLD: 0.2 K/UL
EOSINOPHIL NFR BLD: 2 %
ERYTHROCYTE [DISTWIDTH] IN BLOOD BY AUTOMATED COUNT: 13.5 % (ref 11.6–14.5)
GLUCOSE SERPL-MCNC: 68 MG/DL (ref 70–110)
HCT VFR BLD AUTO: 34.2 % (ref 35–45)
HGB BLD-MCNC: 10.2 G/DL (ref 12–16)
IMM GRANULOCYTES # BLD AUTO: 0.1 K/UL
IMM GRANULOCYTES NFR BLD AUTO: 1 %
LYMPHOCYTES # BLD: 3.6 K/UL
LYMPHOCYTES NFR BLD: 38 %
MAGNESIUM SERPL-MCNC: 1.7 MG/DL (ref 1.7–2.8)
MCH RBC QN AUTO: 30.2 PG (ref 24–34)
MCHC RBC AUTO-ENTMCNC: 29.8 G/DL (ref 31–37)
MCV RBC AUTO: 101.2 FL (ref 74–97)
METAMYELOCYTES NFR BLD MANUAL: 1 %
MONOCYTES # BLD: 0.6 K/UL
MONOCYTES NFR BLD: 6 %
NEUTS BAND NFR BLD MANUAL: 4 %
NEUTS SEG # BLD: 4.5 K/UL
NEUTS SEG NFR BLD: 43 %
OTHER CELLS NFR BLD MANUAL: 5 %
PLATELET # BLD AUTO: 238 K/UL (ref 135–420)
PMV BLD AUTO: 11.3 FL (ref 9.2–11.8)
POTASSIUM SERPL-SCNC: 4.7 MMOL/L (ref 3.2–5.1)
RBC # BLD AUTO: 3.38 M/UL (ref 4.2–5.3)
RBC MORPH BLD: ABNORMAL
SODIUM SERPL-SCNC: 139 MMOL/L (ref 135–145)
WBC # BLD AUTO: 9.5 K/UL (ref 4.6–13.2)

## 2020-10-03 PROCEDURE — 74011250637 HC RX REV CODE- 250/637: Performed by: INTERNAL MEDICINE

## 2020-10-03 PROCEDURE — 36415 COLL VENOUS BLD VENIPUNCTURE: CPT

## 2020-10-03 PROCEDURE — 99218 HC RM OBSERVATION: CPT

## 2020-10-03 PROCEDURE — 96372 THER/PROPH/DIAG INJ SC/IM: CPT

## 2020-10-03 PROCEDURE — 83735 ASSAY OF MAGNESIUM: CPT

## 2020-10-03 PROCEDURE — 74011250636 HC RX REV CODE- 250/636: Performed by: INTERNAL MEDICINE

## 2020-10-03 PROCEDURE — 85025 COMPLETE CBC W/AUTO DIFF WBC: CPT

## 2020-10-03 PROCEDURE — 80048 BASIC METABOLIC PNL TOTAL CA: CPT

## 2020-10-03 RX ADMIN — TOPIRAMATE 100 MG: 100 TABLET, FILM COATED ORAL at 21:49

## 2020-10-03 RX ADMIN — PRAVASTATIN SODIUM 20 MG: 20 TABLET ORAL at 21:49

## 2020-10-03 RX ADMIN — CYANOCOBALAMIN TAB 500 MCG 1000 MCG: 500 TAB at 10:11

## 2020-10-03 RX ADMIN — ASPIRIN 81 MG: 81 TABLET, COATED ORAL at 10:12

## 2020-10-03 RX ADMIN — CALCIUM CARBONATE-CHOLECALCIFEROL TAB 250 MG-125 UNIT 2 TABLET: 250-125 TAB at 10:11

## 2020-10-03 RX ADMIN — HEPARIN SODIUM 5000 UNITS: 5000 INJECTION INTRAVENOUS; SUBCUTANEOUS at 12:04

## 2020-10-03 RX ADMIN — LATANOPROST 1 DROP: 50 SOLUTION OPHTHALMIC at 18:12

## 2020-10-03 RX ADMIN — TOPIRAMATE 100 MG: 100 TABLET, FILM COATED ORAL at 10:11

## 2020-10-03 NOTE — PROGRESS NOTES
Progress Note  Date:10/3/2020       Room:259/01  Patient Name:Patrick Crenshaw     Date of Birth:[birth year]     Age:89 y.o. This is an 80-year-old -American female resident of [facility] who is on day #7 out of 10 of quarantine due to COVID positive status. She remains asymptomatic at this time. She is seen and examined at bedside this morning in no acute distress eating breakfast.  Subjective    Subjective:  Symptoms:  Stable. No shortness of breath, malaise, chest pain or weakness. Diet:  Adequate intake. No nausea or vomiting. Activity level: Normal.    Pain:  She reports no pain. Review of Systems   Constitutional: Negative for fever. Respiratory: Negative for shortness of breath. Cardiovascular: Negative for chest pain. Gastrointestinal: Negative for nausea and vomiting. Neurological: Negative for weakness. All other systems reviewed and are negative. Objective         Vitals Last 24 Hours:  TEMPERATURE:  Temp  Av.8 °F (36.6 °C)  Min: 97.7 °F (36.5 °C)  Max: 97.8 °F (36.6 °C)  RESPIRATIONS RANGE: Resp  Av  Min: 20  Max: 20  PULSE OXIMETRY RANGE: SpO2  Av %  Min: 92 %  Max: 92 %  PULSE RANGE: Pulse  Av.5  Min: 62  Max: 89  BLOOD PRESSURE RANGE: Systolic (55BTJ), SJP:205 , Min:140 , KGO:344   ; Diastolic (33OZA), XLK:65, Min:67, Max:73    I/O (24Hr): Intake/Output Summary (Last 24 hours) at 10/3/2020 0914  Last data filed at 10/3/2020 0454  Gross per 24 hour   Intake 1418 ml   Output    Net 1418 ml     Objective:  General Appearance:  Comfortable, well-appearing, in no acute distress and not in pain. Vital signs: (most recent): Blood pressure (!) 148/67, pulse 89, temperature 97.8 °F (36.6 °C), resp. rate 20, height 5' 5\" (1.651 m), weight 86.2 kg (190 lb 0.6 oz), SpO2 92 %. Vital signs are normal.  No fever. Output: Producing urine and producing stool.     HEENT: Normal HEENT exam.    Lungs:  Normal effort and normal respiratory rate. Breath sounds clear to auscultation. Heart: Normal rate. Regular rhythm. S1 normal and S2 normal.    Abdomen: Abdomen is soft. Bowel sounds are normal.   There is no abdominal tenderness. There is no mass. There is no splenomegaly. There is no hepatomegaly. Extremities: Normal range of motion. Pulses: Distal pulses are intact. Neurological: Patient is alert. Pupils:  Pupils are equal, round, and reactive to light. Skin:  Warm and dry. Labs/Imaging/Diagnostics    Labs:  CBC:  Recent Labs     10/03/20  0345 10/02/20  0615 10/01/20  0405   WBC 9.5 8.0 8.7   RBC 3.38* 3.07* 3.05*   HGB 10.2* 9.5* 9.6*   HCT 34.2* 31.5* 31.7*   .2* 102.6* 103.9*   RDW 13.5 13.4 13.4    208 204     CHEMISTRIES:  Recent Labs     10/03/20  0345 10/02/20  0615 10/01/20  0405    140 142   K 4.7 4.1 4.4    111 114*   CO2 21 22 18*   BUN 37* 45* 52*   CA 9.0 8.7 8.9   MG 1.7 1.8 2.1   PT/INR:No results for input(s): INR, INREXT in the last 72 hours. No lab exists for component: PROTIME  APTT:No results for input(s): APTT in the last 72 hours. LIVER PROFILE:No results for input(s): AST, ALT in the last 72 hours. No lab exists for component: BILIDIR, BILITOT, ALKPHOS  No results found for: ALT, AST, GGT, GGTP, AP, APIT, APX, CBIL, TBIL, TBILI    Imaging Last 24 Hours:  No results found. Assessment//Plan   Principal Problem:    COVID-19 (9/27/2020)    Active Problems:    HTN (hypertension) (6/12/2009)      Dyslipidemia ()      CKD (chronic kidney disease), stage III (10/1/2020)       #1. COVID-19 Positive:  -Asymptomatic.  -Continue supportive care.  -Case management for d/c planning. #2: HTN:  -Chronic condition, controlled. -B/P 115/55  -On Inderal.     #3: Renal Insufficiency  -Cr level 1.90  -BUN 45  -Likely dehydration  -Add gentle hydration    -Nephro consult if not improved with gentle IV hydration   -Encourage PO intake  -BMP in a.m.       #4: Dyslipidemia.  -On statin. Assessment:    Condition: In stable condition. Unchanged. Plan:   Ad mary activity. Advance diet as tolerated. Resume home regimen and resume oral medications.          Electronically signed by Rory Cordero NP on 10/3/2020 at 9:14 AM

## 2020-10-03 NOTE — PROGRESS NOTES
Patient received bedtime medication, reposition in bed, brief noted dry at this time, call bell is in reach.

## 2020-10-03 NOTE — PROGRESS NOTES
Rounding done on patient, patient changed, and subcu heparin given, bed in lowest position and call bell in reach.

## 2020-10-03 NOTE — PROGRESS NOTES
Problem: Pressure Injury - Risk of  Goal: *Prevention of pressure injury  Description: Document Booker Scale and appropriate interventions in the flowsheet. Outcome: Progressing Towards Goal  Note: Pressure Injury Interventions:  Sensory Interventions: Keep linens dry and wrinkle-free, Turn and reposition approx. every two hours (pillows and wedges if needed)    Moisture Interventions: Absorbent underpads, Moisture barrier         Mobility Interventions: Turn and reposition approx. every two hours(pillow and wedges)    Nutrition Interventions: Document food/fluid/supplement intake, Offer support with meals,snacks and hydration    Friction and Shear Interventions: Minimize layers                Problem: Patient Education: Go to Patient Education Activity  Goal: Patient/Family Education  Outcome: Progressing Towards Goal     Problem: Falls - Risk of  Goal: *Absence of Falls  Description: Document Angelo Fall Risk and appropriate interventions in the flowsheet.   Outcome: Progressing Towards Goal  Note: Fall Risk Interventions:       Mentation Interventions: Reorient patient         Elimination Interventions: Call light in reach

## 2020-10-04 LAB — MAGNESIUM SERPL-MCNC: 1.6 MG/DL (ref 1.7–2.8)

## 2020-10-04 PROCEDURE — 99218 HC RM OBSERVATION: CPT

## 2020-10-04 PROCEDURE — 74011250637 HC RX REV CODE- 250/637: Performed by: INTERNAL MEDICINE

## 2020-10-04 PROCEDURE — 36415 COLL VENOUS BLD VENIPUNCTURE: CPT

## 2020-10-04 PROCEDURE — 74011250636 HC RX REV CODE- 250/636: Performed by: INTERNAL MEDICINE

## 2020-10-04 PROCEDURE — 96372 THER/PROPH/DIAG INJ SC/IM: CPT

## 2020-10-04 PROCEDURE — 83735 ASSAY OF MAGNESIUM: CPT

## 2020-10-04 PROCEDURE — 74011250637 HC RX REV CODE- 250/637: Performed by: NURSE PRACTITIONER

## 2020-10-04 RX ADMIN — TOPIRAMATE 100 MG: 100 TABLET, FILM COATED ORAL at 23:30

## 2020-10-04 RX ADMIN — LATANOPROST 1 DROP: 50 SOLUTION OPHTHALMIC at 18:51

## 2020-10-04 RX ADMIN — HEPARIN SODIUM 5000 UNITS: 5000 INJECTION INTRAVENOUS; SUBCUTANEOUS at 12:40

## 2020-10-04 RX ADMIN — TOPIRAMATE 100 MG: 100 TABLET, FILM COATED ORAL at 10:02

## 2020-10-04 RX ADMIN — ACETAMINOPHEN 650 MG: 325 TABLET ORAL at 23:33

## 2020-10-04 RX ADMIN — ACETAMINOPHEN 650 MG: 325 TABLET ORAL at 16:44

## 2020-10-04 RX ADMIN — CYANOCOBALAMIN TAB 500 MCG 1000 MCG: 500 TAB at 10:02

## 2020-10-04 RX ADMIN — HEPARIN SODIUM 5000 UNITS: 5000 INJECTION INTRAVENOUS; SUBCUTANEOUS at 01:48

## 2020-10-04 RX ADMIN — CALCIUM CARBONATE-CHOLECALCIFEROL TAB 250 MG-125 UNIT 2 TABLET: 250-125 TAB at 10:02

## 2020-10-04 RX ADMIN — HEPARIN SODIUM 5000 UNITS: 5000 INJECTION INTRAVENOUS; SUBCUTANEOUS at 23:33

## 2020-10-04 RX ADMIN — ASPIRIN 81 MG: 81 TABLET, COATED ORAL at 10:02

## 2020-10-04 RX ADMIN — PRAVASTATIN SODIUM 20 MG: 20 TABLET ORAL at 23:33

## 2020-10-04 NOTE — PROGRESS NOTES
Progress Note  Date:10/4/2020       Room:259/01  Patient Name:Patrick Crenshaw     Date of Birth:[birth year]     Age:89 y.o. This is an 70-year-old -American female resident of [facility] who is on day #9 out of 10 of quarantine due to COVID positive status. She remains asymptomatic at this time. She is seen and examined at bedside this morning in no acute distress eating breakfast.  Subjective    Subjective:  Symptoms:  Stable. No shortness of breath, malaise, chest pain or weakness. Diet:  Adequate intake. No nausea or vomiting. Activity level: Normal.    Pain:  She reports no pain. Review of Systems   Constitutional: Negative for fever. Respiratory: Negative for shortness of breath. Cardiovascular: Negative for chest pain. Gastrointestinal: Negative for nausea and vomiting. Neurological: Negative for weakness. All other systems reviewed and are negative. Objective         Vitals Last 24 Hours:  TEMPERATURE:  Temp  Av.6 °F (36.4 °C)  Min: 97.4 °F (36.3 °C)  Max: 98 °F (36.7 °C)  RESPIRATIONS RANGE: Resp  Av.7  Min: 18  Max: 20  PULSE OXIMETRY RANGE: SpO2  Av.5 %  Min: 92 %  Max: 97 %  PULSE RANGE: Pulse  Av  Min: 88  Max: 88  BLOOD PRESSURE RANGE: Systolic (26SQZ), BSU:064 , Min:137 , ZOJ:751   ; Diastolic (64EPX), KKA:36, Min:78, Max:85    I/O (24Hr): Intake/Output Summary (Last 24 hours) at 10/4/2020 1054  Last data filed at 10/3/2020 1600  Gross per 24 hour   Intake 680 ml   Output    Net 680 ml     Objective:  General Appearance:  Comfortable, well-appearing, in no acute distress and not in pain. Vital signs: (most recent): Blood pressure (!) 146/78, pulse 88, temperature 97.5 °F (36.4 °C), resp. rate 18, height 5' 5\" (1.651 m), weight 86.2 kg (190 lb 0.6 oz), SpO2 97 %. Vital signs are normal.  No fever. Output: Producing urine and producing stool.     HEENT: Normal HEENT exam.    Lungs:  Normal effort and normal respiratory rate. Breath sounds clear to auscultation. Heart: Normal rate. Regular rhythm. S1 normal and S2 normal.    Abdomen: Abdomen is soft. Bowel sounds are normal.   There is no abdominal tenderness. There is no mass. There is no splenomegaly. There is no hepatomegaly. Extremities: Normal range of motion. Pulses: Distal pulses are intact. Neurological: Patient is alert. Pupils:  Pupils are equal, round, and reactive to light. Skin:  Warm and dry. Labs/Imaging/Diagnostics    Labs:  CBC:  Recent Labs     10/03/20  0345 10/02/20  0615   WBC 9.5 8.0   RBC 3.38* 3.07*   HGB 10.2* 9.5*   HCT 34.2* 31.5*   .2* 102.6*   RDW 13.5 13.4    208     CHEMISTRIES:  Recent Labs     10/04/20  0630 10/03/20  0345 10/02/20  0615   NA  --  139 140   K  --  4.7 4.1   CL  --  109 111   CO2  --  21 22   BUN  --  37* 45*   CA  --  9.0 8.7   MG 1.6* 1.7 1.8   PT/INR:No results for input(s): INR, INREXT, INREXT in the last 72 hours. No lab exists for component: PROTIME  APTT:No results for input(s): APTT in the last 72 hours. LIVER PROFILE:No results for input(s): AST, ALT in the last 72 hours. No lab exists for component: BILIDIR, BILITOT, ALKPHOS  No results found for: ALT, AST, GGT, GGTP, AP, APIT, APX, CBIL, TBIL, TBILI    Imaging Last 24 Hours:  No results found. Assessment//Plan   Principal Problem:    COVID-19 (9/27/2020)    Active Problems:    HTN (hypertension) (6/12/2009)      Dyslipidemia ()      CKD (chronic kidney disease), stage III (10/1/2020)       #1. COVID-19 Positive:  -Asymptomatic.  -Continue supportive care.  -Case management for d/c planning. #2: HTN:  -Chronic condition, controlled. -B/P 137/85  -On Inderal.     #3: Renal Insufficiency  -Cr level 1.90   -Encourage PO intake  -BMP in a.m. #4: Dyslipidemia.  -On statin. Assessment:    Condition: In stable condition. Unchanged. Plan:   Ad mary activity. Advance diet as tolerated. Resume home regimen and resume oral medications. (Return home to [facility] where pt currently resides,  tomorrow, 10/5/2020, if she remains asymptomatic for the next 24 hours. ).        Electronically signed by Ev Mike NP on 10/4/2020 at 9:14 AM

## 2020-10-04 NOTE — PROGRESS NOTES
Problem: Pressure Injury - Risk of  Goal: *Prevention of pressure injury  Description: Document Booker Scale and appropriate interventions in the flowsheet. Outcome: Progressing Towards Goal  Note: Pressure Injury Interventions:  Sensory Interventions: Float heels, Keep linens dry and wrinkle-free, Turn and reposition approx. every two hours (pillows and wedges if needed)    Moisture Interventions: Absorbent underpads, Apply protective barrier, creams and emollients, Maintain skin hydration (lotion/cream), Minimize layers, Moisture barrier         Mobility Interventions: Turn and reposition approx. every two hours(pillow and wedges)    Nutrition Interventions: Document food/fluid/supplement intake, Offer support with meals,snacks and hydration    Friction and Shear Interventions: Apply protective barrier, creams and emollients, Foam dressings/transparent film/skin sealants                Problem: Patient Education: Go to Patient Education Activity  Goal: Patient/Family Education  Outcome: Progressing Towards Goal     Problem: Falls - Risk of  Goal: *Absence of Falls  Description: Document Angelo Fall Risk and appropriate interventions in the flowsheet.   Outcome: Progressing Towards Goal  Note: Fall Risk Interventions:       Mentation Interventions: Reorient patient         Elimination Interventions: Call light in reach              Problem: Nutrition Deficit  Goal: *Optimize nutritional status  Outcome: Progressing Towards Goal

## 2020-10-04 NOTE — PROGRESS NOTES
Temp 100.5 axillary. 2 tylenol given PO for temperature. Pt denies pain, no complaints voiced at this time.

## 2020-10-04 NOTE — PROGRESS NOTES
PATIENT OBSERVED IN BED RESTING AND AROUSES EASILY TO NAME CALLED. FULL BODY ASSESSMENT COMPLETED. PATIENT REORIENTED TO TIME AND PLACE BY NURSE.SOILED DIPER REMOVED AND PATIENT CLEANED. SKIN PROTECTIVE CREAM APPLIED AND CLEAN DIPER PLACED ON PATIENT.patient positioned on right side for comfort. CALL BELL IN REACH. SIDE RAILS UP X2 AND BED IN LOWEST POSITION. IV SITE WITHOUT SIGNS OF INFILTRAION

## 2020-10-04 NOTE — PROGRESS NOTES
rec'd care of pt. Pt resting comfortably in bed. Tremors noted to both upper extremities. Pt is incontinent of urine and stool. Brief intact. Small amt of excoriation noted on buttocks. Foam dressing intact for extra cushion. Patient is confused but pleasant.  TM

## 2020-10-04 NOTE — PROGRESS NOTES
BLOOD DRAWN WITHOUT DIFFICULTY. SPECIMEN GIVEN TO PHLEBOTOMY TO CARRY TO LAB TO BE TESTED. BRIEF NOTED TO BE WET. SOILED BRIEF REMOVED AND DRY ONE APPLIED. CALL BELL IN REACH. SIDE RAILS UP X2 AND BED IN LOWEST POSITION.

## 2020-10-05 LAB
ANION GAP SERPL CALC-SCNC: 7 MMOL/L
BUN SERPL-MCNC: 35 MG/DL (ref 9–21)
BUN/CREAT SERPL: 19
CA-I BLD-MCNC: 8.7 MG/DL (ref 8.5–10.5)
CHLORIDE SERPL-SCNC: 109 MMOL/L (ref 94–111)
CO2 SERPL-SCNC: 21 MMOL/L (ref 21–33)
CREAT SERPL-MCNC: 1.8 MG/DL (ref 0.7–1.2)
GLUCOSE SERPL-MCNC: 77 MG/DL (ref 70–110)
MAGNESIUM SERPL-MCNC: 1.7 MG/DL (ref 1.7–2.8)
POTASSIUM SERPL-SCNC: 4 MMOL/L (ref 3.2–5.1)
SODIUM SERPL-SCNC: 137 MMOL/L (ref 135–145)

## 2020-10-05 PROCEDURE — 74011250637 HC RX REV CODE- 250/637: Performed by: INTERNAL MEDICINE

## 2020-10-05 PROCEDURE — 99218 HC RM OBSERVATION: CPT

## 2020-10-05 PROCEDURE — 96372 THER/PROPH/DIAG INJ SC/IM: CPT

## 2020-10-05 PROCEDURE — 74011250637 HC RX REV CODE- 250/637: Performed by: NURSE PRACTITIONER

## 2020-10-05 PROCEDURE — 36415 COLL VENOUS BLD VENIPUNCTURE: CPT

## 2020-10-05 PROCEDURE — 74011250636 HC RX REV CODE- 250/636: Performed by: INTERNAL MEDICINE

## 2020-10-05 PROCEDURE — 83735 ASSAY OF MAGNESIUM: CPT

## 2020-10-05 PROCEDURE — 80048 BASIC METABOLIC PNL TOTAL CA: CPT

## 2020-10-05 RX ADMIN — LATANOPROST 1 DROP: 50 SOLUTION OPHTHALMIC at 18:42

## 2020-10-05 RX ADMIN — CALCIUM CARBONATE-CHOLECALCIFEROL TAB 250 MG-125 UNIT 2 TABLET: 250-125 TAB at 09:07

## 2020-10-05 RX ADMIN — PRAVASTATIN SODIUM 20 MG: 20 TABLET ORAL at 21:30

## 2020-10-05 RX ADMIN — HEPARIN SODIUM 5000 UNITS: 5000 INJECTION INTRAVENOUS; SUBCUTANEOUS at 12:10

## 2020-10-05 RX ADMIN — ASPIRIN 81 MG: 81 TABLET, COATED ORAL at 09:08

## 2020-10-05 RX ADMIN — TOPIRAMATE 100 MG: 100 TABLET, FILM COATED ORAL at 09:07

## 2020-10-05 RX ADMIN — TOPIRAMATE 100 MG: 100 TABLET, FILM COATED ORAL at 21:30

## 2020-10-05 RX ADMIN — CYANOCOBALAMIN TAB 500 MCG 1000 MCG: 500 TAB at 09:07

## 2020-10-05 RX ADMIN — ACETAMINOPHEN 650 MG: 325 TABLET ORAL at 21:30

## 2020-10-05 NOTE — DISCHARGE SUMMARY
Discharge Summary       PATIENT ID: Lasalle Councilman  MRN: [id number]   YOB: 1930    DATE OF ADMISSION: 9/27/2020  4:25 PM    DATE OF DISCHARGE: 10/05/20    PRIMARY CARE PROVIDER: Alisha Celaya MD     ATTENDING PHYSICIAN: Heather Adkins NP  DISCHARGING PROVIDER: Heather Adkins NP        CONSULTATIONS: None    PROCEDURES/SURGERIES: * No surgery found *    [address] COURSE: COVID 19 Positive    ** Will not d/c patient today due to having a fever on 10/4/20. If remains afebrile overnight, OK to discharge her in the am.    Everardo Curmyranda / PLAN:      COVID-19  Pt with no respiratory complaints/physical complaints  Supportive Care  No oxygen requirements while in the hospital  Day 10/10 in the hospital; D/C to EP today.     Hypertension  Continue Inderal     Dyslipidemia  Continue Pravastatin        ADDITIONAL CARE RECOMMENDATIONS:        PENDING TEST RESULTS:   At the time of discharge the following test results are still pending: None    FOLLOW UP APPOINTMENTS:    Follow-up Information     Follow up With Specialties Details Why Contact Info    Alisha Celaya MD Internal Medicine In 1 week As needed [address] [employer]  [phone]               DIET: Resume previous diet      ACTIVITY: Activity as tolerated and PT/OT Eval and Treat    WOUND CARE: N/A    EQUIPMENT needed: N/A      DISCHARGE MEDICATIONS:  Current Discharge Medication List      CONTINUE these medications which have NOT CHANGED    Details   propranolol LA (INDERAL LA) 120 mg SR capsule Take 120 mg by mouth daily. topiramate (TOPAMAX) 100 mg tablet Take  by mouth two (2) times a day. bimatoprost (LUMIGAN) 0.03 % ophthalmic drops Administer 1 Drop to both eyes every evening. cyanocobalamin 1,000 mcg tablet Take 1,000 mcg by mouth daily. aspirin delayed-release 81 mg tablet Take  by mouth daily.       pravastatin (PRAVACHOL) 20 mg tablet Take 20 mg by mouth nightly. calcium carbonate-vitamin d2 500 mg(1,250mg) -200 unit tab Take  by mouth. NOTIFY YOUR PHYSICIAN FOR ANY OF THE FOLLOWING:   Fever over 101 degrees for 24 hours. Chest pain, shortness of breath, fever, chills, nausea, vomiting, diarrhea, change in mentation, falling, weakness, bleeding. Severe pain or pain not relieved by medications. Or, any other signs or symptoms that you may have questions about. DISPOSITION:    Home With:   OT  PT  HH  RN       Long term SNF/Inpatient Rehab   X Independent/assisted living    Hospice    Other:       PATIENT CONDITION AT DISCHARGE:     Functional status    Poor    X Deconditioned     Independent      Cognition     Lucid     Forgetful    X Dementia      Catheters/lines (plus indication)    Talavera     PICC     PEG    X None      Code status     Full code    X DNR      PHYSICAL EXAMINATION AT DISCHARGE:  Vital signs are normal.  No fever. Output: Producing urine and producing stool. HEENT: Normal HEENT exam.    Lungs:  Normal effort and normal respiratory rate. Breath sounds clear to auscultation. Heart: Normal rate. Regular rhythm. S1 normal and S2 normal.    Abdomen: Abdomen is soft. Bowel sounds are normal.   There is no abdominal tenderness. There is no mass. There is no splenomegaly. There is no hepatomegaly. Extremities: Normal range of motion. Pulses: Distal pulses are intact. Neurological: Patient is alert. Pupils:  Pupils are equal, round, and reactive to light. Skin:  Warm and dry.         CHRONIC MEDICAL DIAGNOSES:  Problem List as of 10/5/2020 Date Reviewed: 10/1/2020          Codes Class Noted - Resolved    CKD (chronic kidney disease), stage III ICD-10-CM: N18.30  ICD-9-CM: 585.3  10/1/2020 - Present        * (Principal) COVID-19 ICD-10-CM: U07.1  ICD-9-CM: 079.89  9/27/2020 - Present        Dyslipidemia ICD-10-CM: E78.5  ICD-9-CM: 272.4  Unknown - Present        Hematuria ICD-10-CM: R31.9  ICD-9-CM: 599.70 Unknown - Present        Lymphoma ([facility] [address] ICD-10-CM: C85.90  ICD-9-CM: 202.80  Unknown - Present        HTN (hypertension) ICD-10-CM: I10  ICD-9-CM: 401.9  6/12/2009 - Present        Vitamin B12 deficiency ICD-10-CM: E53.8  ICD-9-CM: 266.2  6/12/2009 - Present        Intention tremor ICD-10-CM: G25.2  ICD-9-CM: 333.1  6/12/2009 - Present        Falls frequently ICD-10-CM: R29.6  ICD-9-CM: V15.88  6/12/2009 - Present              Greater than 35  minutes were spent with the patient on counseling and coordination of care    Signed:   Princess Rafia NP  10/5/2020  7:19 AM

## 2020-10-05 NOTE — PROGRESS NOTES
Problem: Pressure Injury - Risk of  Goal: *Prevention of pressure injury  Description: Document Booker Scale and appropriate interventions in the flowsheet. Outcome: Progressing Towards Goal  Note: Pressure Injury Interventions:  Sensory Interventions: Float heels, Keep linens dry and wrinkle-free, Turn and reposition approx. every two hours (pillows and wedges if needed)    Moisture Interventions: Absorbent underpads, Apply protective barrier, creams and emollients, Check for incontinence Q2 hours and as needed, Moisture barrier         Mobility Interventions: Turn and reposition approx. every two hours(pillow and wedges)    Nutrition Interventions: Document food/fluid/supplement intake, Offer support with meals,snacks and hydration    Friction and Shear Interventions: Apply protective barrier, creams and emollients, Foam dressings/transparent film/skin sealants                Problem: Patient Education: Go to Patient Education Activity  Goal: Patient/Family Education  Outcome: Progressing Towards Goal     Problem: Falls - Risk of  Goal: *Absence of Falls  Description: Document Angelo Fall Risk and appropriate interventions in the flowsheet.   Outcome: Progressing Towards Goal  Note: Fall Risk Interventions:       Mentation Interventions: Reorient patient         Elimination Interventions: Call light in reach              Problem: Nutrition Deficit  Goal: *Optimize nutritional status  Outcome: Progressing Towards Goal

## 2020-10-05 NOTE — PROGRESS NOTES
PATIENT CONTINUES TO REST QUIETLY AND AROUSES EASILY TO NAME CALLED. SOILED DIPER REMOVED AND PATIENT CLEANED AND FRESH DRY DIPER APPLIED. PATIENT TURNED AND POSITIONED ON RIGHT SIDE. SKIN PROTECTIVE CREAM APPLIED TO BOTTOM. BLOOD DRAWN AS ORDERED AND GIVEN TO PHLEBOTOMIST TO CARRY TO LAB TO BE TESTED. CALL BELL IN REACH. SIDE RAILS UP X2  AND BED IN LOWEST POSITION.

## 2020-10-05 NOTE — DISCHARGE INSTRUCTIONS
Patient Education        [facility] [address] 27 After Treatment for COVID-19: Care Instructions  Overview     You are being sent home from the hospital after being treated for COVID-19. Being in the hospital can be hard, especially if you've been in the intensive care unit (ICU). Even though you're going home, you probably don't feel well yet. Healing from COVID-19 takes time. You may feel very tired for weeks or months afterward, especially if you were on a ventilator. It will take time to get back to your old level of activity. Some people may have long-lasting health problems. But most people can look forward to feeling a little better every day. If you were on a ventilator, your throat may be sore and your voice hoarse or raspy for a while. After leaving the hospital, some people have feelings of anxiety and depression. They may have nightmares. Or in their mind they may relive events that happened in the hospital (flashbacks). You can always reach out to your doctor if you're having trouble with these symptoms. Your doctor will tell you if you need to isolate yourself at home, and when you can end isolation. Follow-up care is a key part of your treatment and safety. Be sure to make and go to all appointments, and call your doctor if you are having problems. It's also a good idea to know your test results and keep a list of the medicines you take. How can you care for yourself at home? · Get plenty of rest. It can help you feel better. · Be kind to yourself if it's taking longer than you expected to feel better. You've been through a stressful time. · Get up and walk around every hour or two while you're resting. Slowly increase your activity as you start to feel better. · Eat healthy foods. · Drink plenty of fluids. If you have kidney, heart, or liver disease and have to limit fluids, talk with your doctor before you increase the amount of fluids you drink.   · Take acetaminophen (such as Tylenol) to reduce a fever. It may also help with muscle aches. Read and follow all instructions on the label. If you are in isolation after you get home  · Wear a cloth face cover when you are around other people. It can help stop the spread of the virus when you cough or sneeze. · Limit contact with people in your home. If possible, stay in a separate bedroom and use a separate bathroom. · If you have to leave home, avoid crowds and try to stay at least 6 feet away from other people. · Avoid contact with pets and other animals. · Cover your mouth and nose with a tissue when you cough or sneeze. Then throw it in the trash right away. · Wash your hands often, especially after you cough or sneeze. Use soap and water, and scrub for at least 20 seconds. If soap and water aren't available, use an alcohol-based hand . · Don't share personal household items. These include bedding, towels, cups and glasses, and eating utensils. · [address] in the warmest water allowed for the fabric type, and dry it completely. It's okay to wash other people's laundry with yours. · Clean and disinfect your home every day. Use household  and disinfectant wipes or sprays. Take special care to clean things that you grab with your hands. These include doorknobs, remote controls, phones, and handles on your refrigerator and microwave. And don't forget countertops, tabletops, bathrooms, and computer keyboards. When should you call for help? Call 911 anytime you think you may need emergency care. For example, call if you have life-threatening symptoms, such as:    · You have severe trouble breathing. (You can't talk at all.)     · You have constant chest pain or pressure.     · You are severely dizzy or lightheaded.     · You are confused or can't think clearly.     · Your face and lips have a blue color.     · You passed out (lost consciousness) or are very hard to wake up.    Call your doctor now or seek immediate medical care if:    · You have moderate trouble breathing. (You can't speak a full sentence.)     · You are coughing up blood (more than about 1 teaspoon).     · You have signs of low blood pressure. These include feeling lightheaded; being too weak to stand; and having cold, pale, clammy skin. Watch closely for changes in your health, and be sure to contact your doctor if:    · Your symptoms get worse.     · You are not getting better as expected.     · You have new or worse symptoms of anxiety, depression, nightmares, or flashbacks. Call before you go to the doctor's office. Follow their instructions. And wear a cloth face cover. Current as of: July 10, 2020               Content Version: 12.6  © 2006-2020 [employer]. Care instructions adapted under license by [employer] (which disclaims liability or warranty for this information). If you have questions about a medical condition or this instruction, always ask your healthcare professional. [place] [address] any warranty or liability for your use of this information.

## 2020-10-05 NOTE — PROGRESS NOTES
PATIENT OBSERVED IN BED WITH EYES CLOSED AND AROUSES EASILY TO NAME CALLED. FULL BODY ASSESSMENT COMPLETED. SKIN WARM AND DRY. RESPIRATIONS EASY AND UNLABORED. PATIENT REORIENT TO TIME AND PLACE BY NURSE. DIPER NOTED TO BE WET. TYLENOL  GIVEN WITH BEDTIME MEDICATIONS FOR COMPLAINTS OF PAIN. SOILED DIPER REMOVED. PATIENT CLEANED AND FRESH DRY DIPER PLAACED ON PATIENT. PATIENT TURNED AND POSITIONED ON LEFT SIDE FOR COMFORT. CALL BELL IN REACH. SIDE RAILS UP X2 AND BED IN LOWEST POSITIO. Taurus Ramsay

## 2020-10-05 NOTE — PROGRESS NOTES
PATIENT CONTINUES TO REST BUT AROUSES EASILY TO NAME CALLED DIPER WET. SOILED DIPER REMOVED. PATIENT CLEANED AND FRESH ONE APPLIED WITH SKIN PROTECTIVE CREAM.

## 2020-10-05 NOTE — PROGRESS NOTES
0730- rec'd care of patient. She is pleasantly confused, resting quietly in bed. 1030- Pt ate all of her toast (except the crust). She drank half of her ensure supplement. Brief changed. Pt repositioned. No s/s of distress. Will cont to monitor. 1230- pt ate a few bites of her meatloaf on lunch tray and drank 1 cup of water. She states she likes the watermelon on her lunch tray. Pt repositioned. Wctm    1435- Pt repositioned to left side. Brief changed, small amount of vaginal discharge noted. Pt denies pain. She is confused but can answer some questions appropriately. Dry and clean brief applied.  WCTM

## 2020-10-06 VITALS
HEART RATE: 71 BPM | OXYGEN SATURATION: 95 % | WEIGHT: 190.04 LBS | RESPIRATION RATE: 18 BRPM | SYSTOLIC BLOOD PRESSURE: 117 MMHG | TEMPERATURE: 98.8 F | DIASTOLIC BLOOD PRESSURE: 73 MMHG | HEIGHT: 65 IN | BODY MASS INDEX: 31.66 KG/M2

## 2020-10-06 PROCEDURE — 74011250636 HC RX REV CODE- 250/636: Performed by: INTERNAL MEDICINE

## 2020-10-06 PROCEDURE — 74011250637 HC RX REV CODE- 250/637: Performed by: INTERNAL MEDICINE

## 2020-10-06 PROCEDURE — 96372 THER/PROPH/DIAG INJ SC/IM: CPT

## 2020-10-06 PROCEDURE — 99218 HC RM OBSERVATION: CPT

## 2020-10-06 RX ADMIN — CALCIUM CARBONATE-CHOLECALCIFEROL TAB 250 MG-125 UNIT 2 TABLET: 250-125 TAB at 09:06

## 2020-10-06 RX ADMIN — ASPIRIN 81 MG: 81 TABLET, COATED ORAL at 09:07

## 2020-10-06 RX ADMIN — TOPIRAMATE 100 MG: 100 TABLET, FILM COATED ORAL at 09:07

## 2020-10-06 RX ADMIN — CYANOCOBALAMIN TAB 500 MCG 1000 MCG: 500 TAB at 09:07

## 2020-10-06 RX ADMIN — HEPARIN SODIUM 5000 UNITS: 5000 INJECTION INTRAVENOUS; SUBCUTANEOUS at 01:28

## 2020-10-06 NOTE — PROGRESS NOTES
PATIENT CONTINUES TO REST QUIETLY AND AROUSES EASILY TO NAME CALLED. VITAL SIGNS OBTAINED. DIPER WET AND REMOVED BY NURSE. BOTTOM CLEANED AND FRESH BRIEF APPLIED NO VAGINAL DISCHARGE NOTED AT THIS TIME. PATIENT TURNED AND POSITIONED FOR COMFORT.

## 2020-10-06 NOTE — PROGRESS NOTES
Assumed care of pt. Pt rec'd resting supine in bed. Awake and alert to self only. VS and assessment completed. Pt remains on Droplet Plus precautions for COVID-19. Pt on RA and asymptomatic. Likely to be d/c'd back to EP today. Pt refused breakfast stating \"I dont eat anymore\". Multiple attempts made to encourage pt to eat. All unsuccessful. Pt did drink 50% of chocolate ensure. Brief clean and dry. Denies pain. Bed lockled and low. CBWR.

## 2020-10-06 NOTE — MANAGEMENT PLAN
Agree with current POC, patient is stable for discharge back to her home at . She is afebrile and on RA. Patient evaluated at bedside this morning. She is in no acute distress. She has been afebrile for the past 36 hours. She is medically stable to be discharged back to [facility]. Will utilize the discharge summary from yesterday. Okay to discharge patient to [facility] with same medications and diet as previously ordered.

## 2020-10-06 NOTE — PROGRESS NOTES
PATIENT RESTING QUIETLY AND AROUSES EASILY TO NAME CALLED. SKIN WARM AND DRY. RESPIRATIONS EASY AND UNLABORED. PATIENT REORIENTED TO TIME AND PLACE BY NURSE.BRIEF NOTED TO BE WET. SOILED DIPER REMOVED. PATIENT CLEANED AND NURSE NOTED MILKY WHITE VAGINAL DISCHARGE AT THIS TIME WHICH HAS A FOWEL ODOR. FRESH DIPER APPLIED . PATIENT POSITIONED ON BACK FOR COMFORT. CALL BELL IN REACH. SIDE RAILS UP X2 AND BED IN LOWEST POSITION. TYLENOL GIVEN WITH MEDTIME MEDICATIONS FOR COMPLAINTS OF PAIN.

## 2020-10-06 NOTE — PROGRESS NOTES
Nutrition Note    Pt to be discharged today back to [facility]. Intake did not meet goal of 51-75% meals and supplement. Resident took 50% of Ensure today. Nutrition Rx for discharge: Cardiac Diet with Ensure Enlive TID. RD to follow up in [facility] on food selections and possible need for appetite stimulant. BUN - 35 - Elevated additional fluids on trays and between meals. 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 No aggressive nutrition support warranted.      Electronically signed by Keli Gonzales on 10/6/2020 at 10:10 AM    Contact: [phone]

## 2020-10-06 NOTE — PROGRESS NOTES
Pt accepted and tolerated all AM meds whole in applesauce. D/C order rec'd and processed. Call placed to EP and notified them of her coming back.

## 2020-10-06 NOTE — PROGRESS NOTES
PATIENT RESTING QUIETLY AND AROUSES EASILY TO NAME CALLED. BRIEF NOTED TO BE WET. SOILED DIPER REMOVED. PATIENT CLEANED AND FRESH DRY DIPER REMOVED. CALL BELL IN REACH. SIDE RAILS UP X2 AND BED IN LOWEST POSITION.

## 2020-10-15 ENCOUNTER — HOSPITAL ENCOUNTER (OUTPATIENT)
Dept: LAB | Age: 85
Discharge: HOME | End: 2020-10-15
Payer: MEDICARE

## 2020-10-15 DIAGNOSIS — F02.80 ALZHEIMER'S DISEASE (HCC): ICD-10-CM

## 2020-10-15 DIAGNOSIS — G30.9 ALZHEIMER'S DISEASE (HCC): ICD-10-CM

## 2020-10-15 DIAGNOSIS — I12.9 MALIGNANT HYPERTENSIVE KIDNEY DISEASE WITH CHRONIC KIDNEY DISEASE STAGE I THROUGH STAGE IV, OR UNSPECIFIED(403.00): ICD-10-CM

## 2020-10-15 DIAGNOSIS — C67.9 BLADDER CANCER (HCC): ICD-10-CM

## 2020-10-15 DIAGNOSIS — N17.9 ACUTE KIDNEY FAILURE, UNSPECIFIED (HCC): ICD-10-CM

## 2020-10-15 DIAGNOSIS — N18.4 CHRONIC KIDNEY DISEASE, STAGE IV (SEVERE) (HCC): ICD-10-CM

## 2020-10-15 PROCEDURE — 84134 ASSAY OF PREALBUMIN: CPT

## 2020-10-15 PROCEDURE — 36415 COLL VENOUS BLD VENIPUNCTURE: CPT

## 2020-10-16 LAB — PREALB SERPL-MCNC: 3.9 MG/DL (ref 20–40)

## 2020-10-20 ENCOUNTER — TRANSCRIBE ORDER (OUTPATIENT)
Dept: REGISTRATION | Age: 85
End: 2020-10-20

## 2020-10-20 DIAGNOSIS — C67.9 BLADDER CANCER (HCC): ICD-10-CM

## 2020-10-20 DIAGNOSIS — G30.9 ALZHEIMER'S DISEASE (HCC): Primary | ICD-10-CM

## 2020-10-20 DIAGNOSIS — I12.9 MALIGNANT HYPERTENSIVE KIDNEY DISEASE WITH CHRONIC KIDNEY DISEASE STAGE I THROUGH STAGE IV, OR UNSPECIFIED(403.00): ICD-10-CM

## 2020-10-20 DIAGNOSIS — N17.9 ACUTE KIDNEY FAILURE, UNSPECIFIED (HCC): ICD-10-CM

## 2020-10-20 DIAGNOSIS — F02.80 ALZHEIMER'S DISEASE (HCC): Primary | ICD-10-CM

## 2020-10-20 DIAGNOSIS — N18.4 CHRONIC KIDNEY DISEASE, STAGE IV (SEVERE) (HCC): ICD-10-CM

## 2020-10-21 ENCOUNTER — TELEPHONE (OUTPATIENT)
Dept: FAMILY MEDICINE CLINIC | Age: 85
End: 2020-10-21

## 2020-10-21 DIAGNOSIS — U07.1 COVID-19: Primary | ICD-10-CM

## 2020-10-21 RX ORDER — MORPHINE SULFATE 20 MG/ML
0.25 SOLUTION ORAL
Qty: 30 ML | Refills: 0 | Status: SHIPPED | OUTPATIENT
Start: 2020-10-21 | End: 2020-10-24

## 2022-12-15 NOTE — PROGRESS NOTES
Pt's intake has reduced due to change in taste perception. She is drinking better than eating. Ensure Enlive will be added TID to improve intake. Nursing encouraging fluids between and at meal time. Doxycycline Counseling:  Patient counseled regarding possible photosensitivity and increased risk for sunburn.  Patient instructed to avoid sunlight, if possible.  When exposed to sunlight, patients should wear protective clothing, sunglasses, and sunscreen.  The patient was instructed to call the office immediately if the following severe adverse effects occur:  hearing changes, easy bruising/bleeding, severe headache, or vision changes.  The patient verbalized understanding of the proper use and possible adverse effects of doxycycline.  All of the patient's questions and concerns were addressed.